# Patient Record
Sex: MALE | Race: WHITE | Employment: FULL TIME | URBAN - METROPOLITAN AREA
[De-identification: names, ages, dates, MRNs, and addresses within clinical notes are randomized per-mention and may not be internally consistent; named-entity substitution may affect disease eponyms.]

---

## 2018-05-01 ENCOUNTER — NURSE ONLY (OUTPATIENT)
Dept: INTERNAL MEDICINE CLINIC | Facility: HOSPITAL | Age: 33
End: 2018-05-01
Attending: EMERGENCY MEDICINE

## 2018-05-01 DIAGNOSIS — Z00.00 WELLNESS EXAMINATION: Primary | ICD-10-CM

## 2018-05-01 PROCEDURE — 86480 TB TEST CELL IMMUN MEASURE: CPT

## 2018-09-11 ENCOUNTER — HOSPITAL ENCOUNTER (OUTPATIENT)
Age: 33
Discharge: HOME | End: 2018-09-11
Attending: EMERGENCY MEDICINE
Payer: COMMERCIAL

## 2018-09-11 VITALS
DIASTOLIC BLOOD PRESSURE: 94 MMHG | SYSTOLIC BLOOD PRESSURE: 134 MMHG | RESPIRATION RATE: 18 BRPM | HEART RATE: 63 BPM | TEMPERATURE: 99 F | OXYGEN SATURATION: 99 %

## 2018-09-11 DIAGNOSIS — H57.89 EYE IRRITATION: Primary | ICD-10-CM

## 2018-09-11 DIAGNOSIS — H21.01 HYPHEMA, RIGHT: ICD-10-CM

## 2018-09-11 PROCEDURE — 99202 OFFICE O/P NEW SF 15 MIN: CPT

## 2018-09-11 NOTE — ED PROVIDER NOTES
Patient Seen in: [facility]    History   Patient presents with:  Irritation    Stated Complaint: eye irritation 3 days    HPI    Right eye irritation for 3 days and took off contacts and did not improve.   States that 3 weeks ago Left Ear: External ear normal.   Mouth/Throat: Oropharynx is clear and moist.   Eyes: EOM are normal. Pupils are equal, round, and reactive to light. Right conjunctiva is injected. Scleral icterus is present. Right pupil is round and reactive.  Left pupil i

## 2018-09-11 NOTE — ED INITIAL ASSESSMENT (HPI)
CC: right eye irritation/redness for 3 days, with yellowish discharge and possible foreign body- states \"feels like some fragments of contact lens. \"

## 2019-05-07 ENCOUNTER — APPOINTMENT (OUTPATIENT)
Dept: OTHER | Facility: HOSPITAL | Age: 34
End: 2019-05-07
Attending: ORTHOPAEDIC SURGERY

## 2019-08-14 ENCOUNTER — OFFICE VISIT (OUTPATIENT)
Dept: RHEUMATOLOGY | Facility: CLINIC | Age: 34
End: 2019-08-14
Payer: COMMERCIAL

## 2019-08-14 VITALS
BODY MASS INDEX: 36.43 KG/M2 | HEIGHT: 69 IN | DIASTOLIC BLOOD PRESSURE: 81 MMHG | SYSTOLIC BLOOD PRESSURE: 137 MMHG | WEIGHT: 246 LBS | HEART RATE: 65 BPM

## 2019-08-14 DIAGNOSIS — L40.9 PSORIASIS: Primary | ICD-10-CM

## 2019-08-14 DIAGNOSIS — R25.2 HAND CRAMPS: ICD-10-CM

## 2019-08-14 PROCEDURE — 99244 OFF/OP CNSLTJ NEW/EST MOD 40: CPT | Performed by: INTERNAL MEDICINE

## 2019-08-14 RX ORDER — KETOCONAZOLE 20 MG/G
CREAM TOPICAL 2 TIMES DAILY PRN
Refills: 2 | COMMUNITY
Start: 2019-08-05 | End: 2020-08-27

## 2019-08-14 RX ORDER — TACROLIMUS 1 MG/G
OINTMENT TOPICAL 2 TIMES DAILY
Refills: 1 | COMMUNITY
Start: 2019-08-05 | End: 2020-08-27

## 2019-08-14 RX ORDER — DESONIDE 0.5 MG/G
CREAM TOPICAL
Refills: 1 | COMMUNITY
Start: 2019-08-05 | End: 2020-08-27

## 2019-08-14 RX ORDER — CLOBETASOL PROPIONATE 0.5 MG/G
CREAM TOPICAL 2 TIMES DAILY
Refills: 0 | COMMUNITY
Start: 2019-08-05 | End: 2020-08-27

## 2019-08-14 NOTE — PROGRESS NOTES
Dear Dr. Kaufman Re: I saw your patient Jonathan Martinez in consultation this afternoon at your request for evaluation of hand cramps and arthralgias. As you know, he is a 27-year-old gentleman who 10 years ago developed severe psoriasis.   He was started on ix weight loss, skin sun sensitivity. Usual male pattern hair loss. No eye inflammation, oral and nasal ulcers, lymphadenopathy. No shortness of breath or chest pain. Mild acid reflux.   No stomach pain, nausea or vomiting, constipation or diarrhea, blood

## 2019-09-10 ENCOUNTER — HOSPITAL ENCOUNTER (OUTPATIENT)
Dept: GENERAL RADIOLOGY | Facility: HOSPITAL | Age: 34
Discharge: HOME | End: 2019-09-10
Attending: DERMATOLOGY
Payer: COMMERCIAL

## 2019-09-10 DIAGNOSIS — Z86.11 HISTORY OF TUBERCULOSIS: ICD-10-CM

## 2019-09-10 PROCEDURE — 71046 X-RAY EXAM CHEST 2 VIEWS: CPT | Performed by: DERMATOLOGY

## 2020-07-01 ENCOUNTER — APPOINTMENT (OUTPATIENT)
Dept: OTHER | Facility: HOSPITAL | Age: 35
End: 2020-07-01
Attending: EMERGENCY MEDICINE

## 2020-08-20 ENCOUNTER — NURSE TRIAGE (OUTPATIENT)
Dept: INTERNAL MEDICINE CLINIC | Facility: CLINIC | Age: 35
End: 2020-08-20

## 2020-08-20 ENCOUNTER — LAB ENCOUNTER (OUTPATIENT)
Dept: LAB | Facility: HOSPITAL | Age: 35
End: 2020-08-20
Attending: PREVENTIVE MEDICINE
Payer: COMMERCIAL

## 2020-08-20 ENCOUNTER — TELEPHONE (OUTPATIENT)
Dept: INTERNAL MEDICINE CLINIC | Facility: HOSPITAL | Age: 35
End: 2020-08-20

## 2020-08-20 DIAGNOSIS — Z20.822 SUSPECTED COVID-19 VIRUS INFECTION: Primary | ICD-10-CM

## 2020-08-20 DIAGNOSIS — Z20.822 SUSPECTED COVID-19 VIRUS INFECTION: ICD-10-CM

## 2020-08-20 LAB — SARS-COV-2 RNA RESP QL NAA+PROBE: DETECTED

## 2020-08-20 NOTE — TELEPHONE ENCOUNTER
Action Requested: Summary for Provider     []  Critical Lab, Recommendations Needed  [] Need Additional Advice  []   FYI    []   Need Orders  [] Need Medications Sent to Pharmacy  []  Other     SUMMARY: has tested positive for covid-19 today.   Reports head

## 2020-08-21 ENCOUNTER — TELEMEDICINE (OUTPATIENT)
Dept: INTERNAL MEDICINE CLINIC | Facility: CLINIC | Age: 35
End: 2020-08-21

## 2020-08-21 DIAGNOSIS — R05.9 COUGH: ICD-10-CM

## 2020-08-21 DIAGNOSIS — U07.1 COVID-19 VIRUS DETECTED: Primary | ICD-10-CM

## 2020-08-21 PROCEDURE — 99213 OFFICE O/P EST LOW 20 MIN: CPT | Performed by: PHYSICIAN ASSISTANT

## 2020-08-21 NOTE — PROGRESS NOTES
This is a telemedicine visit with live, interactive video and audio. Patient understands and accepts financial responsibility for any deductible, co-insurance and/or co-pays associated with this service.     SUBJECTIVE  Patient presents for follow up of

## 2020-08-27 ENCOUNTER — TELEPHONE (OUTPATIENT)
Dept: INTERNAL MEDICINE CLINIC | Facility: CLINIC | Age: 35
End: 2020-08-27

## 2020-08-27 ENCOUNTER — VIRTUAL PHONE E/M (OUTPATIENT)
Dept: INTERNAL MEDICINE CLINIC | Facility: CLINIC | Age: 35
End: 2020-08-27
Payer: COMMERCIAL

## 2020-08-27 ENCOUNTER — PATIENT OUTREACH (OUTPATIENT)
Dept: CASE MANAGEMENT | Age: 35
End: 2020-08-27

## 2020-08-27 DIAGNOSIS — R51.9 GENERALIZED HEADACHE: ICD-10-CM

## 2020-08-27 DIAGNOSIS — U07.1 COVID-19 VIRUS DETECTED: Primary | ICD-10-CM

## 2020-08-27 PROCEDURE — 99213 OFFICE O/P EST LOW 20 MIN: CPT | Performed by: PHYSICIAN ASSISTANT

## 2020-08-27 RX ORDER — IBUPROFEN 600 MG/1
600 TABLET ORAL EVERY 6 HOURS PRN
Qty: 30 TABLET | Refills: 0 | Status: SHIPPED | OUTPATIENT
Start: 2020-08-27 | End: 2021-09-29 | Stop reason: ALTCHOICE

## 2020-08-27 NOTE — TELEPHONE ENCOUNTER
Called [facility] and pharmacist verifies Rx was received; states she is the only one at the pharmacy this morning and did not speak with pt.  States [facility] [place] on [address] and seems pt used to go to the other one; wonders if pt meant it to be sent th

## 2020-08-27 NOTE — PROGRESS NOTES
Virtual Telephone Check-In    Radha Rose verbally consents to a Virtual/Telephone Check-In visit on 08/27/20. Patient has been referred to the [facility] website at [email or web address] to review the yearly Consent to Treat document.     Patient unde

## 2020-08-27 NOTE — TELEPHONE ENCOUNTER
Patient called and advised he called [facility] about 1 hour ago and they have no record of this prescription being sent to them. Please Advise.       Please Use Pharmacy:  [facility] [address] [facility] [address], [place]     Medication Needed:  Nirav Brown

## 2020-08-27 NOTE — PROGRESS NOTES
Sylvester Quigley PA-C  Em Rn Triage 2 hours ago (9:50 AM)      Can we please add this pt to covid home monitoring?

## 2020-08-28 ENCOUNTER — PATIENT OUTREACH (OUTPATIENT)
Dept: CASE MANAGEMENT | Age: 35
End: 2020-08-28

## 2020-08-28 NOTE — PROGRESS NOTES
Home Monitoring Condition Update    Covid19+ test date: 8/20/20      Consent Verification:  Assessment Completed With: Patient  HIPAA Verified?   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       The patient was also directed to continue to isolate away from other household members when possible and stay completely isolated from the gener

## 2020-08-31 ENCOUNTER — TELEPHONE (OUTPATIENT)
Dept: INTERNAL MEDICINE CLINIC | Facility: CLINIC | Age: 35
End: 2020-08-31

## 2020-08-31 ENCOUNTER — VIRTUAL PHONE E/M (OUTPATIENT)
Dept: INTERNAL MEDICINE CLINIC | Facility: CLINIC | Age: 35
End: 2020-08-31
Payer: COMMERCIAL

## 2020-08-31 DIAGNOSIS — U07.1 COVID-19 VIRUS DETECTED: Primary | ICD-10-CM

## 2020-08-31 PROCEDURE — 99213 OFFICE O/P EST LOW 20 MIN: CPT | Performed by: PHYSICIAN ASSISTANT

## 2020-08-31 NOTE — TELEPHONE ENCOUNTER
Patient had virtual visit today and was cleared to return to work and letter was provided. Please advise if patient can be removed from home monitoring program.  Patient completed 1 assessment. Thank you!      REPLY TO [facility] HOME MONITORING POOL

## 2020-09-22 ENCOUNTER — HOSPITAL ENCOUNTER (OUTPATIENT)
Age: 35
Discharge: HOME | End: 2020-09-22
Payer: COMMERCIAL

## 2020-09-22 ENCOUNTER — TELEPHONE (OUTPATIENT)
Dept: OPHTHALMOLOGY | Facility: CLINIC | Age: 35
End: 2020-09-22

## 2020-09-22 VITALS
BODY MASS INDEX: 34.07 KG/M2 | TEMPERATURE: 97 F | SYSTOLIC BLOOD PRESSURE: 143 MMHG | HEIGHT: 69 IN | DIASTOLIC BLOOD PRESSURE: 93 MMHG | WEIGHT: 230 LBS | HEART RATE: 85 BPM | RESPIRATION RATE: 20 BRPM | OXYGEN SATURATION: 98 %

## 2020-09-22 DIAGNOSIS — H05.229 ORBITAL SWELLING: ICD-10-CM

## 2020-09-22 DIAGNOSIS — H00.024 HORDEOLUM INTERNUM OF LEFT UPPER EYELID: Primary | ICD-10-CM

## 2020-09-22 PROCEDURE — 87205 SMEAR GRAM STAIN: CPT | Performed by: NURSE PRACTITIONER

## 2020-09-22 PROCEDURE — 87070 CULTURE OTHR SPECIMN AEROBIC: CPT | Performed by: NURSE PRACTITIONER

## 2020-09-22 PROCEDURE — 99204 OFFICE O/P NEW MOD 45 MIN: CPT

## 2020-09-22 PROCEDURE — 99214 OFFICE O/P EST MOD 30 MIN: CPT

## 2020-09-22 PROCEDURE — 87077 CULTURE AEROBIC IDENTIFY: CPT | Performed by: NURSE PRACTITIONER

## 2020-09-22 RX ORDER — TETRACAINE HYDROCHLORIDE 5 MG/ML
2 SOLUTION OPHTHALMIC ONCE
Status: COMPLETED | OUTPATIENT
Start: 2020-09-22 | End: 2020-09-22

## 2020-09-22 RX ORDER — CEPHALEXIN 500 MG/1
500 CAPSULE ORAL 4 TIMES DAILY
Qty: 28 CAPSULE | Refills: 0 | Status: SHIPPED | OUTPATIENT
Start: 2020-09-22 | End: 2020-09-29

## 2020-09-22 NOTE — ED INITIAL ASSESSMENT (HPI)
PATIENT ARRIVED AMBULATORY TO ROOM. PATIENT STATES \"I THINK I GOT SOMETHING IN MY LEFT EYE A FEW DAYS AGO. IT MAY HAVE BEEN CONCRETE DUST. I THINK I GOT IT OUT BUT MY EYELID IS ALL SWOLLEN\" +REDNESS AND SWELLING TO THE LEFT UPPER EYELID.  NO CONTACT LENS

## 2020-09-22 NOTE — ED PROVIDER NOTES
[facility]      History   Patient presents with:  Eye Problem    Stated Complaint: left eye swollen    HPI  This is a 78-year-old male presenting with left upper eyelid swelling.   Patient states on Friday Left Ear: Tympanic membrane normal.      Nose: Nose normal.      Mouth/Throat:      Mouth: Mucous membranes are moist.      Pharynx: Oropharynx is clear. Eyes:      Extraocular Movements: Extraocular movements intact.       Conjunctiva/sclera: Conjunct 49-year-old male well-appearing and nontoxic presenting with left upper lid swelling.   Procedure for tetracaine and fluorescein staining performed no corneal uptake did note uptake to the left upper lid where it appears patient has a very superficial lacer

## 2020-09-22 NOTE — TELEPHONE ENCOUNTER
Spoke with patient. He is taking oral medication and feels like symptoms are improving. He does not think he needs a follow up at this time.   I told him to finish the course of antibiotics as directed and per Dr. Katheryn Kemp advice to use warm compresses

## 2020-10-29 ENCOUNTER — HOSPITAL ENCOUNTER (OUTPATIENT)
Age: 35
Discharge: HOME | End: 2020-10-29
Attending: EMERGENCY MEDICINE
Payer: COMMERCIAL

## 2020-10-29 VITALS
DIASTOLIC BLOOD PRESSURE: 96 MMHG | RESPIRATION RATE: 18 BRPM | OXYGEN SATURATION: 98 % | SYSTOLIC BLOOD PRESSURE: 141 MMHG | TEMPERATURE: 97 F | HEART RATE: 74 BPM

## 2020-10-29 DIAGNOSIS — K92.2 GASTROINTESTINAL HEMORRHAGE, UNSPECIFIED GASTROINTESTINAL HEMORRHAGE TYPE: Primary | ICD-10-CM

## 2020-10-29 PROCEDURE — 99213 OFFICE O/P EST LOW 20 MIN: CPT

## 2020-10-29 PROCEDURE — 85025 COMPLETE CBC W/AUTO DIFF WBC: CPT | Performed by: EMERGENCY MEDICINE

## 2020-10-29 PROCEDURE — 36415 COLL VENOUS BLD VENIPUNCTURE: CPT

## 2020-10-29 RX ORDER — PANTOPRAZOLE SODIUM 40 MG/1
40 TABLET, DELAYED RELEASE ORAL DAILY
Qty: 14 TABLET | Refills: 0 | Status: SHIPPED | OUTPATIENT
Start: 2020-10-29 | End: 2020-11-12

## 2020-10-29 NOTE — ED INITIAL ASSESSMENT (HPI)
Pt here to IC with c/o upper abd pain, intermittent for the past few days. Pt states he had black diarrhea stool yesterday then turned to bright red blood.

## 2020-10-29 NOTE — ED PROVIDER NOTES
Patient Seen in: [facility]      History   Patient presents with:  Abdomen/Flank Pain    Stated Complaint: Stomach issues; blood in stool    HPI    39 yo male with epigastric pain and blood in his stool for a few days. No vomiting. No fever. Status: He is alert. Sensory: No sensory deficit.    Psychiatric:         Mood and Affect: Mood normal.         Behavior: Behavior normal.              ED Course     Labs Reviewed   POCT CBC - Abnormal; Notable for the following components:       Resul

## 2020-11-02 NOTE — H&P
[address] [facility] [address] Gastroenterology                                                                                                  Clinic History and Physical     Pa quittin.2      Smokeless tobacco: Never Used    Alcohol use: Yes      Frequency: 2-3 times a week    Drug use: Never       Medications (Active prior to today's visit):  Current Outpatient Medications   Medication Sig Dispense Refill   • ibuprofen 600 M vitals reviewed    Labs/Imaging:     Patient's labs and imaging were reviewed and discussed with patient today. See HPI and A&P for further details.      .  ASSESSMENT/PLAN:   Mague Swartz is a 38year old year-old male pt of Dr. Yaritza finley Imaging & Referrals:  None       CHANO Morfin  11/16/2020

## 2020-11-16 ENCOUNTER — OFFICE VISIT (OUTPATIENT)
Dept: GASTROENTEROLOGY | Facility: CLINIC | Age: 35
End: 2020-11-16
Payer: COMMERCIAL

## 2020-11-16 VITALS
BODY MASS INDEX: 34.8 KG/M2 | WEIGHT: 235 LBS | DIASTOLIC BLOOD PRESSURE: 86 MMHG | HEIGHT: 69 IN | SYSTOLIC BLOOD PRESSURE: 132 MMHG | HEART RATE: 64 BPM

## 2020-11-16 DIAGNOSIS — R10.10 UPPER ABDOMINAL PAIN: ICD-10-CM

## 2020-11-16 DIAGNOSIS — K62.5 BRBPR (BRIGHT RED BLOOD PER RECTUM): Primary | ICD-10-CM

## 2020-11-16 PROCEDURE — 99072 ADDL SUPL MATRL&STAF TM PHE: CPT | Performed by: NURSE PRACTITIONER

## 2020-11-16 PROCEDURE — 3075F SYST BP GE 130 - 139MM HG: CPT | Performed by: NURSE PRACTITIONER

## 2020-11-16 PROCEDURE — 3008F BODY MASS INDEX DOCD: CPT | Performed by: NURSE PRACTITIONER

## 2020-11-16 PROCEDURE — 3079F DIAST BP 80-89 MM HG: CPT | Performed by: NURSE PRACTITIONER

## 2020-11-16 PROCEDURE — 99203 OFFICE O/P NEW LOW 30 MIN: CPT | Performed by: NURSE PRACTITIONER

## 2020-12-18 NOTE — PROGRESS NOTES
Patient needs to schedule appointment with me.Has been almost 1 year since seen.    Call and schedule patient for sometime in the next 1-2 months.   Virtual Telephone Check-In    Jim Mariscal verbally consents to a Virtual/Telephone Check-In visit on 08/31/20. Patient has been referred to the [facility] website at [email or web address] to review the yearly Consent to Treat document.     Patient rasheed

## 2020-12-30 ENCOUNTER — TELEPHONE (OUTPATIENT)
Dept: GASTROENTEROLOGY | Facility: CLINIC | Age: 35
End: 2020-12-30

## 2021-01-21 ENCOUNTER — IMMUNIZATION (OUTPATIENT)
Dept: LAB | Facility: HOSPITAL | Age: 36
End: 2021-01-21
Attending: EMERGENCY MEDICINE
Payer: COMMERCIAL

## 2021-01-21 DIAGNOSIS — Z23 NEED FOR VACCINATION: Primary | ICD-10-CM

## 2021-01-21 PROCEDURE — 0011A SARSCOV2 VAC 100MCG/0.5ML IM: CPT

## 2021-01-26 ENCOUNTER — TELEPHONE (OUTPATIENT)
Dept: INTERNAL MEDICINE CLINIC | Facility: CLINIC | Age: 36
End: 2021-01-26

## 2021-02-18 ENCOUNTER — IMMUNIZATION (OUTPATIENT)
Dept: LAB | Facility: HOSPITAL | Age: 36
End: 2021-02-18
Attending: PREVENTIVE MEDICINE
Payer: COMMERCIAL

## 2021-02-18 DIAGNOSIS — Z23 NEED FOR VACCINATION: Primary | ICD-10-CM

## 2021-02-18 PROCEDURE — 0012A SARSCOV2 VAC 100MCG/0.5ML IM: CPT

## 2021-07-28 ENCOUNTER — APPOINTMENT (OUTPATIENT)
Dept: OTHER | Facility: HOSPITAL | Age: 36
End: 2021-07-28
Attending: EMERGENCY MEDICINE

## 2021-09-28 NOTE — PROGRESS NOTES
FAMILY MEDICINE CLINIC NOTE    HPI  Carol Luong is a 38year old male presenting for physical    #Health Maintenance  -Diet: Eats fruits, vegetables, meat, dairy. Fast food.    -Exercise: None - just walks around at work  -Lung cancer screen: Not ind lung, latent        PAST SOCIAL HISTORY  Social History    Socioeconomic History      Marital status: Single      Spouse name: Not on file      Number of children: Not on file      Years of education: Not on file      Highest education level: Not on file Active Member of Clubs or Organizations: Not on file      Attends Club or Organization Meetings: Not on file      Marital Status: Not on file  Intimate Partner Violence:       Fear of Current or Ex-Partner: Not on file      Emotionally Abused: Not on file LEONIDES Salomon [place], [place]     DIAGNOSTICS    ASSESSMENT/PLAN  Problem List Items Addressed This Visit        Respiratory    TB lung, latent     History of latent TB, reports that he was treated for 4 months.   We will check chest x-ray this

## 2021-09-29 ENCOUNTER — OFFICE VISIT (OUTPATIENT)
Dept: INTERNAL MEDICINE CLINIC | Facility: CLINIC | Age: 36
End: 2021-09-29
Payer: COMMERCIAL

## 2021-09-29 VITALS
BODY MASS INDEX: 36.41 KG/M2 | SYSTOLIC BLOOD PRESSURE: 110 MMHG | OXYGEN SATURATION: 99 % | DIASTOLIC BLOOD PRESSURE: 76 MMHG | WEIGHT: 245.81 LBS | HEIGHT: 69 IN | HEART RATE: 65 BPM

## 2021-09-29 DIAGNOSIS — E66.9 OBESITY (BMI 30-39.9): ICD-10-CM

## 2021-09-29 DIAGNOSIS — L40.9 PSORIASIS: ICD-10-CM

## 2021-09-29 DIAGNOSIS — Z00.00 HEALTH MAINTENANCE EXAMINATION: Primary | ICD-10-CM

## 2021-09-29 DIAGNOSIS — Z22.7 TB LUNG, LATENT: ICD-10-CM

## 2021-09-29 PROBLEM — J30.9 ALLERGIC RHINITIS: Status: ACTIVE | Noted: 2021-09-29

## 2021-09-29 PROBLEM — H10.13 ALLERGIC CONJUNCTIVITIS OF BOTH EYES: Status: ACTIVE | Noted: 2021-09-29

## 2021-09-29 PROBLEM — M19.90 ARTHRITIS: Status: ACTIVE | Noted: 2021-09-29

## 2021-09-29 PROBLEM — J34.89 POSTERIOR RHINORRHEA: Status: RESOLVED | Noted: 2021-09-29 | Resolved: 2021-09-29

## 2021-09-29 PROBLEM — J34.89 POSTERIOR RHINORRHEA: Status: ACTIVE | Noted: 2021-09-29

## 2021-09-29 PROBLEM — H10.13 ALLERGIC CONJUNCTIVITIS OF BOTH EYES: Status: RESOLVED | Noted: 2021-09-29 | Resolved: 2021-09-29

## 2021-09-29 PROBLEM — L20.9 ATOPIC DERMATITIS: Status: RESOLVED | Noted: 2021-09-29 | Resolved: 2021-09-29

## 2021-09-29 PROBLEM — L20.9 ATOPIC DERMATITIS: Status: ACTIVE | Noted: 2021-09-29

## 2021-09-29 LAB
ALBUMIN SERPL-MCNC: 4 G/DL (ref 3.4–5)
ALBUMIN/GLOB SERPL: 1 {RATIO} (ref 1–2)
ALP LIVER SERPL-CCNC: 81 U/L
ALT SERPL-CCNC: 49 U/L
ANION GAP SERPL CALC-SCNC: 6 MMOL/L (ref 0–18)
AST SERPL-CCNC: 22 U/L (ref 15–37)
BASOPHILS # BLD AUTO: 0.06 X10(3) UL (ref 0–0.2)
BASOPHILS NFR BLD AUTO: 0.9 %
BILIRUB SERPL-MCNC: 0.5 MG/DL (ref 0.1–2)
BUN BLD-MCNC: 12 MG/DL (ref 7–18)
BUN/CREAT SERPL: 11.4 (ref 10–20)
CALCIUM BLD-MCNC: 9.3 MG/DL (ref 8.5–10.1)
CHLORIDE SERPL-SCNC: 108 MMOL/L (ref 98–112)
CHOLEST SERPL-MCNC: 215 MG/DL (ref ?–200)
CO2 SERPL-SCNC: 24 MMOL/L (ref 21–32)
CREAT BLD-MCNC: 1.05 MG/DL
DEPRECATED RDW RBC AUTO: 41.7 FL (ref 35.1–46.3)
EOSINOPHIL # BLD AUTO: 0.11 X10(3) UL (ref 0–0.7)
EOSINOPHIL NFR BLD AUTO: 1.7 %
ERYTHROCYTE [DISTWIDTH] IN BLOOD BY AUTOMATED COUNT: 11.9 % (ref 11–15)
EST. AVERAGE GLUCOSE BLD GHB EST-MCNC: 103 MG/DL (ref 68–126)
GLOBULIN PLAS-MCNC: 4 G/DL (ref 2.8–4.4)
GLUCOSE BLD-MCNC: 95 MG/DL (ref 70–99)
HBA1C MFR BLD HPLC: 5.2 % (ref ?–5.7)
HCT VFR BLD AUTO: 50.1 %
HCV AB SERPL QL IA: NONREACTIVE
HDLC SERPL-MCNC: 42 MG/DL (ref 40–59)
HGB BLD-MCNC: 16.6 G/DL
IMM GRANULOCYTES # BLD AUTO: 0.02 X10(3) UL (ref 0–1)
IMM GRANULOCYTES NFR BLD: 0.3 %
LDLC SERPL CALC-MCNC: 149 MG/DL (ref ?–100)
LYMPHOCYTES # BLD AUTO: 2.52 X10(3) UL (ref 1–4)
LYMPHOCYTES NFR BLD AUTO: 37.8 %
MCH RBC QN AUTO: 31.6 PG (ref 26–34)
MCHC RBC AUTO-ENTMCNC: 33.1 G/DL (ref 31–37)
MCV RBC AUTO: 95.4 FL
MONOCYTES # BLD AUTO: 0.65 X10(3) UL (ref 0.1–1)
MONOCYTES NFR BLD AUTO: 9.8 %
NEUTROPHILS # BLD AUTO: 3.3 X10 (3) UL (ref 1.5–7.7)
NEUTROPHILS # BLD AUTO: 3.3 X10(3) UL (ref 1.5–7.7)
NEUTROPHILS NFR BLD AUTO: 49.5 %
NONHDLC SERPL-MCNC: 173 MG/DL (ref ?–130)
OSMOLALITY SERPL CALC.SUM OF ELEC: 286 MOSM/KG (ref 275–295)
PATIENT FASTING Y/N/NP: YES
PATIENT FASTING Y/N/NP: YES
PLATELET # BLD AUTO: 207 10(3)UL (ref 150–450)
POTASSIUM SERPL-SCNC: 4.1 MMOL/L (ref 3.5–5.1)
PROT SERPL-MCNC: 8 G/DL (ref 6.4–8.2)
RBC # BLD AUTO: 5.25 X10(6)UL
SODIUM SERPL-SCNC: 138 MMOL/L (ref 136–145)
TRIGL SERPL-MCNC: 132 MG/DL (ref 30–149)
TSI SER-ACNC: 0.84 MIU/ML (ref 0.36–3.74)
VLDLC SERPL CALC-MCNC: 25 MG/DL (ref 0–30)
WBC # BLD AUTO: 6.7 X10(3) UL (ref 4–11)

## 2021-09-29 PROCEDURE — 90715 TDAP VACCINE 7 YRS/> IM: CPT | Performed by: FAMILY MEDICINE

## 2021-09-29 PROCEDURE — 84443 ASSAY THYROID STIM HORMONE: CPT | Performed by: FAMILY MEDICINE

## 2021-09-29 PROCEDURE — 85025 COMPLETE CBC W/AUTO DIFF WBC: CPT | Performed by: FAMILY MEDICINE

## 2021-09-29 PROCEDURE — 3008F BODY MASS INDEX DOCD: CPT | Performed by: FAMILY MEDICINE

## 2021-09-29 PROCEDURE — 80053 COMPREHEN METABOLIC PANEL: CPT | Performed by: FAMILY MEDICINE

## 2021-09-29 PROCEDURE — 3074F SYST BP LT 130 MM HG: CPT | Performed by: FAMILY MEDICINE

## 2021-09-29 PROCEDURE — 99395 PREV VISIT EST AGE 18-39: CPT | Performed by: FAMILY MEDICINE

## 2021-09-29 PROCEDURE — 96127 BRIEF EMOTIONAL/BEHAV ASSMT: CPT | Performed by: FAMILY MEDICINE

## 2021-09-29 PROCEDURE — 87389 HIV-1 AG W/HIV-1&-2 AB AG IA: CPT | Performed by: FAMILY MEDICINE

## 2021-09-29 PROCEDURE — 90472 IMMUNIZATION ADMIN EACH ADD: CPT | Performed by: FAMILY MEDICINE

## 2021-09-29 PROCEDURE — 90686 IIV4 VACC NO PRSV 0.5 ML IM: CPT | Performed by: FAMILY MEDICINE

## 2021-09-29 PROCEDURE — 3078F DIAST BP <80 MM HG: CPT | Performed by: FAMILY MEDICINE

## 2021-09-29 PROCEDURE — 83036 HEMOGLOBIN GLYCOSYLATED A1C: CPT | Performed by: FAMILY MEDICINE

## 2021-09-29 PROCEDURE — 90471 IMMUNIZATION ADMIN: CPT | Performed by: FAMILY MEDICINE

## 2021-09-29 PROCEDURE — 80061 LIPID PANEL: CPT | Performed by: FAMILY MEDICINE

## 2021-09-29 PROCEDURE — 86803 HEPATITIS C AB TEST: CPT | Performed by: FAMILY MEDICINE

## 2021-09-29 RX ORDER — RISANKIZUMAB-RZAA 150 MG/ML
INJECTION SUBCUTANEOUS
COMMUNITY
Start: 2021-09-13

## 2021-09-29 RX ORDER — RISANKIZUMAB-RZAA 75 MG/0.83
KIT SUBCUTANEOUS
COMMUNITY
Start: 2021-06-25

## 2021-09-29 NOTE — ASSESSMENT & PLAN NOTE
Exercise and diet advised. CBC, CMP, lipid panel, Hba1c, TSH, HIV screen, hepatitis C screen  Tdap today. Flu shot today. Advanced directive information provided.

## 2021-09-29 NOTE — PATIENT INSTRUCTIONS
• Thank you for seeing me today, it was a pleasure taking care of you. • Please check out at the  and schedule a follow up appointment. Return in about 6 months (around 3/29/2022) for follow up.   • Please make a lab appointment to get your bloo

## 2021-09-29 NOTE — ASSESSMENT & PLAN NOTE
History of latent TB, reports that he was treated for 4 months.   We will check chest x-ray this time to make sure that it remains clear, especially since he is on [employer]

## 2021-09-30 ENCOUNTER — HOSPITAL ENCOUNTER (OUTPATIENT)
Dept: GENERAL RADIOLOGY | Facility: HOSPITAL | Age: 36
Discharge: HOME | End: 2021-09-30
Attending: FAMILY MEDICINE
Payer: COMMERCIAL

## 2021-09-30 DIAGNOSIS — Z22.7 TB LUNG, LATENT: ICD-10-CM

## 2021-09-30 PROBLEM — E78.5 DYSLIPIDEMIA: Status: ACTIVE | Noted: 2021-09-30

## 2021-09-30 PROCEDURE — 71046 X-RAY EXAM CHEST 2 VIEWS: CPT | Performed by: FAMILY MEDICINE

## 2022-07-27 ENCOUNTER — APPOINTMENT (OUTPATIENT)
Dept: OTHER | Facility: HOSPITAL | Age: 37
End: 2022-07-27
Attending: EMERGENCY MEDICINE

## 2022-09-29 ENCOUNTER — OFFICE VISIT (OUTPATIENT)
Dept: INTERNAL MEDICINE CLINIC | Facility: CLINIC | Age: 37
End: 2022-09-29

## 2022-09-29 VITALS
SYSTOLIC BLOOD PRESSURE: 118 MMHG | TEMPERATURE: 98 F | OXYGEN SATURATION: 99 % | HEART RATE: 60 BPM | BODY MASS INDEX: 36.73 KG/M2 | DIASTOLIC BLOOD PRESSURE: 88 MMHG | HEIGHT: 69 IN | WEIGHT: 248 LBS

## 2022-09-29 DIAGNOSIS — E78.5 DYSLIPIDEMIA: ICD-10-CM

## 2022-09-29 DIAGNOSIS — H91.92 DECREASED HEARING OF LEFT EAR: ICD-10-CM

## 2022-09-29 DIAGNOSIS — R06.83 SNORING: ICD-10-CM

## 2022-09-29 DIAGNOSIS — E66.9 OBESITY (BMI 30-39.9): ICD-10-CM

## 2022-09-29 DIAGNOSIS — Z00.00 HEALTH MAINTENANCE EXAMINATION: Primary | ICD-10-CM

## 2022-09-29 DIAGNOSIS — J30.9 ALLERGIC RHINITIS, UNSPECIFIED SEASONALITY, UNSPECIFIED TRIGGER: ICD-10-CM

## 2022-09-29 DIAGNOSIS — L40.9 PSORIASIS: ICD-10-CM

## 2022-09-29 PROBLEM — M19.90 ARTHRITIS: Status: RESOLVED | Noted: 2021-09-29 | Resolved: 2022-09-29

## 2022-09-29 LAB
ALBUMIN SERPL-MCNC: 4 G/DL (ref 3.4–5)
ALBUMIN/GLOB SERPL: 1 {RATIO} (ref 1–2)
ALP LIVER SERPL-CCNC: 76 U/L
ALT SERPL-CCNC: 54 U/L
ANION GAP SERPL CALC-SCNC: 7 MMOL/L (ref 0–18)
AST SERPL-CCNC: 23 U/L (ref 15–37)
BASOPHILS # BLD AUTO: 0.06 X10(3) UL (ref 0–0.2)
BASOPHILS NFR BLD AUTO: 0.8 %
BILIRUB SERPL-MCNC: 0.6 MG/DL (ref 0.1–2)
BUN BLD-MCNC: 9 MG/DL (ref 7–18)
BUN/CREAT SERPL: 8.3 (ref 10–20)
CALCIUM BLD-MCNC: 9.3 MG/DL (ref 8.5–10.1)
CHLORIDE SERPL-SCNC: 106 MMOL/L (ref 98–112)
CHOLEST SERPL-MCNC: 191 MG/DL (ref ?–200)
CO2 SERPL-SCNC: 25 MMOL/L (ref 21–32)
CREAT BLD-MCNC: 1.09 MG/DL
DEPRECATED RDW RBC AUTO: 43 FL (ref 35.1–46.3)
EOSINOPHIL # BLD AUTO: 0.13 X10(3) UL (ref 0–0.7)
EOSINOPHIL NFR BLD AUTO: 1.8 %
ERYTHROCYTE [DISTWIDTH] IN BLOOD BY AUTOMATED COUNT: 11.8 % (ref 11–15)
EST. AVERAGE GLUCOSE BLD GHB EST-MCNC: 103 MG/DL (ref 68–126)
FASTING PATIENT LIPID ANSWER: YES
FASTING STATUS PATIENT QL REPORTED: YES
GFR SERPLBLD BASED ON 1.73 SQ M-ARVRAT: 89 ML/MIN/1.73M2 (ref 60–?)
GLOBULIN PLAS-MCNC: 3.9 G/DL (ref 2.8–4.4)
GLUCOSE BLD-MCNC: 81 MG/DL (ref 70–99)
HBA1C MFR BLD: 5.2 % (ref ?–5.7)
HCT VFR BLD AUTO: 52 %
HDLC SERPL-MCNC: 40 MG/DL (ref 40–59)
HGB BLD-MCNC: 16.7 G/DL
IMM GRANULOCYTES # BLD AUTO: 0.02 X10(3) UL (ref 0–1)
IMM GRANULOCYTES NFR BLD: 0.3 %
LDLC SERPL CALC-MCNC: 131 MG/DL (ref ?–100)
LYMPHOCYTES # BLD AUTO: 2.82 X10(3) UL (ref 1–4)
LYMPHOCYTES NFR BLD AUTO: 38.1 %
MCH RBC QN AUTO: 31.7 PG (ref 26–34)
MCHC RBC AUTO-ENTMCNC: 32.1 G/DL (ref 31–37)
MCV RBC AUTO: 98.7 FL
MONOCYTES # BLD AUTO: 0.61 X10(3) UL (ref 0.1–1)
MONOCYTES NFR BLD AUTO: 8.2 %
NEUTROPHILS # BLD AUTO: 3.76 X10 (3) UL (ref 1.5–7.7)
NEUTROPHILS # BLD AUTO: 3.76 X10(3) UL (ref 1.5–7.7)
NEUTROPHILS NFR BLD AUTO: 50.8 %
NONHDLC SERPL-MCNC: 151 MG/DL (ref ?–130)
OSMOLALITY SERPL CALC.SUM OF ELEC: 284 MOSM/KG (ref 275–295)
PLATELET # BLD AUTO: 209 10(3)UL (ref 150–450)
POTASSIUM SERPL-SCNC: 4 MMOL/L (ref 3.5–5.1)
PROT SERPL-MCNC: 7.9 G/DL (ref 6.4–8.2)
RBC # BLD AUTO: 5.27 X10(6)UL
SODIUM SERPL-SCNC: 138 MMOL/L (ref 136–145)
TRIGL SERPL-MCNC: 109 MG/DL (ref 30–149)
VLDLC SERPL CALC-MCNC: 20 MG/DL (ref 0–30)
WBC # BLD AUTO: 7.4 X10(3) UL (ref 4–11)

## 2022-09-29 PROCEDURE — 83036 HEMOGLOBIN GLYCOSYLATED A1C: CPT | Performed by: FAMILY MEDICINE

## 2022-09-29 PROCEDURE — 3008F BODY MASS INDEX DOCD: CPT | Performed by: FAMILY MEDICINE

## 2022-09-29 PROCEDURE — 3079F DIAST BP 80-89 MM HG: CPT | Performed by: FAMILY MEDICINE

## 2022-09-29 PROCEDURE — 85025 COMPLETE CBC W/AUTO DIFF WBC: CPT | Performed by: FAMILY MEDICINE

## 2022-09-29 PROCEDURE — 80053 COMPREHEN METABOLIC PANEL: CPT | Performed by: FAMILY MEDICINE

## 2022-09-29 PROCEDURE — 80061 LIPID PANEL: CPT | Performed by: FAMILY MEDICINE

## 2022-09-29 PROCEDURE — 99395 PREV VISIT EST AGE 18-39: CPT | Performed by: FAMILY MEDICINE

## 2022-09-29 PROCEDURE — 3074F SYST BP LT 130 MM HG: CPT | Performed by: FAMILY MEDICINE

## 2022-09-29 NOTE — ASSESSMENT & PLAN NOTE
Patient with a history of snoring, daytime fatigue, and has been observed to stop breathing at night. Elevated STOP-BANG score  Will refer to pulmonology for sleep evaluation.

## 2022-09-29 NOTE — ASSESSMENT & PLAN NOTE
Patient reports subjective decreased hearing in the left ear only when using his phone. He is not with any decreased hearing to finger rub or when I am talking. He notes that he had a normal hearing test recently. Will refer to ENT to further evaluate.

## 2022-09-29 NOTE — ASSESSMENT & PLAN NOTE
Exercise and diet advised. CBC, CMP, lipid panel, Hba1c  Flu shot advised - he will get later at work  Advanced directive information provided. Advised COVID vaccine booster.

## 2022-11-15 ENCOUNTER — IMMUNIZATION (OUTPATIENT)
Dept: LAB | Facility: HOSPITAL | Age: 37
End: 2022-11-15
Attending: PREVENTIVE MEDICINE
Payer: COMMERCIAL

## 2022-11-15 DIAGNOSIS — Z23 NEED FOR VACCINATION: Primary | ICD-10-CM

## 2022-11-15 PROCEDURE — 90471 IMMUNIZATION ADMIN: CPT

## 2022-11-21 ENCOUNTER — OFFICE VISIT (OUTPATIENT)
Dept: PULMONOLOGY | Facility: CLINIC | Age: 37
End: 2022-11-21
Payer: COMMERCIAL

## 2022-11-21 VITALS
OXYGEN SATURATION: 98 % | HEART RATE: 71 BPM | HEIGHT: 69 IN | RESPIRATION RATE: 16 BRPM | WEIGHT: 249 LBS | BODY MASS INDEX: 36.88 KG/M2 | DIASTOLIC BLOOD PRESSURE: 84 MMHG | SYSTOLIC BLOOD PRESSURE: 130 MMHG

## 2022-11-21 DIAGNOSIS — G47.33 OSA (OBSTRUCTIVE SLEEP APNEA): Primary | ICD-10-CM

## 2022-11-21 PROCEDURE — 3075F SYST BP GE 130 - 139MM HG: CPT | Performed by: INTERNAL MEDICINE

## 2022-11-21 PROCEDURE — 3008F BODY MASS INDEX DOCD: CPT | Performed by: INTERNAL MEDICINE

## 2022-11-21 PROCEDURE — 99243 OFF/OP CNSLTJ NEW/EST LOW 30: CPT | Performed by: INTERNAL MEDICINE

## 2022-11-21 PROCEDURE — 3079F DIAST BP 80-89 MM HG: CPT | Performed by: INTERNAL MEDICINE

## 2022-11-21 NOTE — PROGRESS NOTES
Dear Arnie Looney:           As you know, Onesimo Francois is a 75-year-old  for the hospital who I am now evaluating for sleep disturbance. HISTORY OF PRESENT ILLNESS: The patient describes having difficulty initiating and maintaining sleep as well as excessive daytime somnolence and difficulty awakening with unrefreshing sleep, snoring, witnessed apneic events, without associated drowsy driving. There is no cataplexy, sleep paralysis nor hypnagogic hallucination. There is occasional urge to move the limbs at night. The patient gets 6 to 8 hours of sleep per night, going to bed between 10 and 11, falling asleep within 15 to 30 minutes, awakening once or twice and then getting out of bed at 5:30 in the morning. He does drink alcohol 6-8 beers several times per week. There is been no recent weight gain, morning headache no depressed mood and the Varina Sleepiness Scale score is 7 out of 24. PAST MEDICAL AND SURGICAL HISTORY:   1. Dyslipidemia psoriasis latent TB    SOCIAL HISTORY: Single, no kids, quit tobacco 3 years ago after 1 pack/day for 15 years, 6-8 beers on several occasions per week,  for the hospital    FAMILY HISTORY: Mother and father alive and well    ALLERGIES TO MEDICATIONS: None    MEDICATIONS: None    REVIEW OF SYSTEMS: Review of Systems:  Vision normal. Ear nose and throat normal. Bowel normal. Bladder function normal. No depression. No thyroid disease. No lymphatic system concerns. Rash. Muscles and joints unremarkable. No weight loss no weight gain. PHYSICAL EXAMINATION: Physical Examination:  Vital signs normal. HEENT examination is unremarkable with pupils equal round and reactive to light and accommodation. Neck without adenopathy, thyromegaly, JVD nor bruit. Lungs clear to auscultation and percussion. Cardiac regular rate and rhythm no murmur. Abdomen nontender, without hepatosplenomegaly and no mass appreciable.  Extremities and Musculoskeletal without clubbing cyanosis nor edema, and mobility acceptable. Neurologic grossly intact with symmetric tone and strength and reflex. Crowded oropharynx Mallampati score 4. Generous tonsils. LABORATORY: None    ASSESSMENT AND PLAN:  PROBLEM 1. Sleep disturbance-my strong suspicion is that the patient has clinically significant obstructive sleep apnea with snoring, witnessed apneic events, unrefreshing sleep, excessive daytime somnolence, crowded oropharynx with weight of 249 pounds. He would benefit from polysomnography with split-night CPAP titration. RECOMMENDATIONS:  1.  Polysomnography with split-night CPAP titration  2. Weight loss  3. Avoid alcohol  4. Avoid sedating drug  5. Never drive if sleepy  6. Sleep apnea literature provided  7. If patient is set up with CPAP, needs to return to see me at the 3 to 4-month interval with download of data from the respiratory device to assess for efficacy and compliance. I am delighted to assist in Neil's care.             With warmest regards,     Bret Rockwell MD  Medical Director, [address], [address] [facility]  Medical Director, [address] [place] [address]. [phone]

## 2022-12-14 ENCOUNTER — ORDER TRANSCRIPTION (OUTPATIENT)
Dept: SLEEP CENTER | Age: 37
End: 2022-12-14

## 2022-12-14 DIAGNOSIS — G47.33 OBSTRUCTIVE SLEEP APNEA (ADULT) (PEDIATRIC): Primary | ICD-10-CM

## 2023-01-23 ENCOUNTER — LAB ENCOUNTER (OUTPATIENT)
Dept: LAB | Facility: HOSPITAL | Age: 38
End: 2023-01-23
Attending: INTERNAL MEDICINE
Payer: COMMERCIAL

## 2023-01-23 DIAGNOSIS — G47.33 OBSTRUCTIVE SLEEP APNEA (ADULT) (PEDIATRIC): ICD-10-CM

## 2023-01-23 LAB — SARS-COV-2 RNA RESP QL NAA+PROBE: NOT DETECTED

## 2023-01-26 ENCOUNTER — OFFICE VISIT (OUTPATIENT)
Dept: SLEEP CENTER | Age: 38
End: 2023-01-26
Attending: INTERNAL MEDICINE
Payer: COMMERCIAL

## 2023-01-26 DIAGNOSIS — G47.33 OSA (OBSTRUCTIVE SLEEP APNEA): ICD-10-CM

## 2023-01-26 PROCEDURE — 95811 POLYSOM 6/>YRS CPAP 4/> PARM: CPT

## 2023-02-01 ENCOUNTER — TELEPHONE (OUTPATIENT)
Dept: PULMONOLOGY | Facility: CLINIC | Age: 38
End: 2023-02-01

## 2023-02-01 DIAGNOSIS — G47.33 OSA (OBSTRUCTIVE SLEEP APNEA): Primary | ICD-10-CM

## 2023-02-01 NOTE — TELEPHONE ENCOUNTER
----- Message from Ana Haro MD sent at 1/31/2023  4:47 PM CST -----  RN, please call the patient to explain that he has severe obstructive sleep apnea and that he stops breathing 74 times per hour. Please facilitate CPAP 11 CWP.   Chio Cantrell

## 2023-02-01 NOTE — TELEPHONE ENCOUNTER
Spoke with patient and he verbalizes understanding of result note. Sleep study, face sheet, DME order, and LOV note faxed to E. He is aware he will need appt 30-90 days after starting to use CPAP. He is already scheduled for 5/22/23 and understands he may need to move appt sooner.

## 2023-04-17 ENCOUNTER — OFFICE VISIT (OUTPATIENT)
Dept: PULMONOLOGY | Facility: CLINIC | Age: 38
End: 2023-04-17

## 2023-04-17 VITALS
OXYGEN SATURATION: 97 % | WEIGHT: 246 LBS | DIASTOLIC BLOOD PRESSURE: 79 MMHG | BODY MASS INDEX: 36.43 KG/M2 | HEART RATE: 78 BPM | RESPIRATION RATE: 16 BRPM | SYSTOLIC BLOOD PRESSURE: 128 MMHG | HEIGHT: 69 IN

## 2023-04-17 DIAGNOSIS — R61 NIGHT SWEATS: ICD-10-CM

## 2023-04-17 DIAGNOSIS — G47.33 OSA (OBSTRUCTIVE SLEEP APNEA): Primary | ICD-10-CM

## 2023-04-17 PROCEDURE — 3078F DIAST BP <80 MM HG: CPT | Performed by: INTERNAL MEDICINE

## 2023-04-17 PROCEDURE — 3074F SYST BP LT 130 MM HG: CPT | Performed by: INTERNAL MEDICINE

## 2023-04-17 PROCEDURE — 3008F BODY MASS INDEX DOCD: CPT | Performed by: INTERNAL MEDICINE

## 2023-04-17 PROCEDURE — 99213 OFFICE O/P EST LOW 20 MIN: CPT | Performed by: INTERNAL MEDICINE

## 2023-04-17 NOTE — PROGRESS NOTES
The patient is a 10-year-old male who I know well from prior evaluation and comes in now for follow-up. In general, he is doing well. He has a daytime fatigue which is lingering despite good treatment for the sleep apnea. His average daily usage of the CPAP device is 5 hours and 12 minutes and residual events are only 1 an hour. He does get occasional night sweats. Review of Systems:  Vision normal. Ear nose and throat normal. Bowel normal. Bladder function normal. No depression. No thyroid disease. No lymphatic system concerns. No rash. Muscles and joints unremarkable. No weight loss no weight gain. Physical Examination:  Vital signs normal. HEENT examination is unremarkable with pupils equal round and reactive to light and accommodation. Neck without adenopathy, thyromegaly, JVD nor bruit. Lungs clear to auscultation and percussion. Cardiac regular rate and rhythm no murmur. Abdomen nontender, without hepatosplenomegaly and no mass appreciable. Extremities and Musculoskeletal without clubbing cyanosis nor edema, and mobility acceptable. Neurologic grossly intact with symmetric tone and strength and reflex. Assessment and plan:  1. Obstructive sleep apnea- control is pretty good. His download is good. He could use the device more. Recommendations: Vigilance with CPAP, weight loss, avoid alcohol and sedating drug and never drive if sleepy. 2.  Occasional night sweats-the patient has been treated for latent tuberculosis 6 years ago with a 4-month course of therapy. Recommendations: TSH, CBC, chest x-ray, keep the bedroom cool. Contact me if worsening or new problem. Otherwise, patient should see me again at the 1 year interval or sooner if needed.

## 2023-05-05 ENCOUNTER — HOSPITAL ENCOUNTER (OUTPATIENT)
Dept: GENERAL RADIOLOGY | Facility: HOSPITAL | Age: 38
Discharge: HOME | End: 2023-05-05
Attending: INTERNAL MEDICINE
Payer: COMMERCIAL

## 2023-05-05 ENCOUNTER — LAB ENCOUNTER (OUTPATIENT)
Dept: LAB | Facility: HOSPITAL | Age: 38
End: 2023-05-05
Attending: INTERNAL MEDICINE
Payer: COMMERCIAL

## 2023-05-05 DIAGNOSIS — R61 NIGHT SWEATS: ICD-10-CM

## 2023-05-05 LAB
BASOPHILS # BLD AUTO: 0.06 X10(3) UL (ref 0–0.2)
BASOPHILS NFR BLD AUTO: 0.9 %
DEPRECATED RDW RBC AUTO: 42.9 FL (ref 35.1–46.3)
EOSINOPHIL # BLD AUTO: 0.15 X10(3) UL (ref 0–0.7)
EOSINOPHIL NFR BLD AUTO: 2.2 %
ERYTHROCYTE [DISTWIDTH] IN BLOOD BY AUTOMATED COUNT: 12 % (ref 11–15)
HCT VFR BLD AUTO: 46.8 %
HGB BLD-MCNC: 15.6 G/DL
IMM GRANULOCYTES # BLD AUTO: 0.04 X10(3) UL (ref 0–1)
IMM GRANULOCYTES NFR BLD: 0.6 %
LYMPHOCYTES # BLD AUTO: 2.31 X10(3) UL (ref 1–4)
LYMPHOCYTES NFR BLD AUTO: 33.8 %
MCH RBC QN AUTO: 32 PG (ref 26–34)
MCHC RBC AUTO-ENTMCNC: 33.3 G/DL (ref 31–37)
MCV RBC AUTO: 96.1 FL
MONOCYTES # BLD AUTO: 0.62 X10(3) UL (ref 0.1–1)
MONOCYTES NFR BLD AUTO: 9.1 %
NEUTROPHILS # BLD AUTO: 3.65 X10 (3) UL (ref 1.5–7.7)
NEUTROPHILS # BLD AUTO: 3.65 X10(3) UL (ref 1.5–7.7)
NEUTROPHILS NFR BLD AUTO: 53.4 %
PLATELET # BLD AUTO: 203 10(3)UL (ref 150–450)
RBC # BLD AUTO: 4.87 X10(6)UL
TSI SER-ACNC: 1.09 MIU/ML (ref 0.36–3.74)
WBC # BLD AUTO: 6.8 X10(3) UL (ref 4–11)

## 2023-05-05 PROCEDURE — 71046 X-RAY EXAM CHEST 2 VIEWS: CPT | Performed by: INTERNAL MEDICINE

## 2023-05-05 PROCEDURE — 84443 ASSAY THYROID STIM HORMONE: CPT

## 2023-05-05 PROCEDURE — 36415 COLL VENOUS BLD VENIPUNCTURE: CPT

## 2023-05-05 PROCEDURE — 85025 COMPLETE CBC W/AUTO DIFF WBC: CPT

## 2023-07-21 ENCOUNTER — OFFICE VISIT (OUTPATIENT)
Dept: INTERNAL MEDICINE CLINIC | Facility: CLINIC | Age: 38
End: 2023-07-21
Payer: COMMERCIAL

## 2023-07-21 VITALS
RESPIRATION RATE: 17 BRPM | HEART RATE: 63 BPM | BODY MASS INDEX: 36.14 KG/M2 | HEIGHT: 69 IN | OXYGEN SATURATION: 99 % | SYSTOLIC BLOOD PRESSURE: 126 MMHG | DIASTOLIC BLOOD PRESSURE: 86 MMHG | WEIGHT: 244 LBS

## 2023-07-21 DIAGNOSIS — Z11.3 SCREEN FOR STD (SEXUALLY TRANSMITTED DISEASE): ICD-10-CM

## 2023-07-21 DIAGNOSIS — G47.33 OSA (OBSTRUCTIVE SLEEP APNEA): ICD-10-CM

## 2023-07-21 DIAGNOSIS — H91.92 DECREASED HEARING OF LEFT EAR: ICD-10-CM

## 2023-07-21 DIAGNOSIS — R53.83 OTHER FATIGUE: ICD-10-CM

## 2023-07-21 DIAGNOSIS — Z00.00 HEALTH MAINTENANCE EXAMINATION: Primary | ICD-10-CM

## 2023-07-21 DIAGNOSIS — E66.09 CLASS 2 OBESITY DUE TO EXCESS CALORIES WITHOUT SERIOUS COMORBIDITY WITH BODY MASS INDEX (BMI) OF 36.0 TO 36.9 IN ADULT: ICD-10-CM

## 2023-07-21 DIAGNOSIS — Z22.7 TB LUNG, LATENT: ICD-10-CM

## 2023-07-21 DIAGNOSIS — J30.9 ALLERGIC RHINITIS, UNSPECIFIED SEASONALITY, UNSPECIFIED TRIGGER: ICD-10-CM

## 2023-07-21 DIAGNOSIS — L40.9 PSORIASIS: ICD-10-CM

## 2023-07-21 DIAGNOSIS — E78.5 DYSLIPIDEMIA: ICD-10-CM

## 2023-07-21 PROBLEM — R06.83 SNORING: Status: RESOLVED | Noted: 2022-09-29 | Resolved: 2023-07-21

## 2023-07-21 PROBLEM — E66.812 CLASS 2 OBESITY DUE TO EXCESS CALORIES WITHOUT SERIOUS COMORBIDITY WITH BODY MASS INDEX (BMI) OF 36.0 TO 36.9 IN ADULT: Status: ACTIVE | Noted: 2021-09-29

## 2023-07-21 LAB
ALBUMIN SERPL-MCNC: 4 G/DL (ref 3.4–5)
ALBUMIN/GLOB SERPL: 1.1 {RATIO} (ref 1–2)
ALP LIVER SERPL-CCNC: 71 U/L
ALT SERPL-CCNC: 49 U/L
ANION GAP SERPL CALC-SCNC: 10 MMOL/L (ref 0–18)
AST SERPL-CCNC: 24 U/L (ref 15–37)
BILIRUB SERPL-MCNC: 0.5 MG/DL (ref 0.1–2)
BUN BLD-MCNC: 10 MG/DL (ref 7–18)
BUN/CREAT SERPL: 9 (ref 10–20)
CALCIUM BLD-MCNC: 9.2 MG/DL (ref 8.5–10.1)
CHLORIDE SERPL-SCNC: 109 MMOL/L (ref 98–112)
CHOLEST SERPL-MCNC: 173 MG/DL (ref ?–200)
CO2 SERPL-SCNC: 22 MMOL/L (ref 21–32)
CREAT BLD-MCNC: 1.11 MG/DL
EGFRCR SERPLBLD CKD-EPI 2021: 87 ML/MIN/1.73M2 (ref 60–?)
EST. AVERAGE GLUCOSE BLD GHB EST-MCNC: 97 MG/DL (ref 68–126)
FASTING PATIENT LIPID ANSWER: YES
FASTING STATUS PATIENT QL REPORTED: YES
GLOBULIN PLAS-MCNC: 3.7 G/DL (ref 2.8–4.4)
GLUCOSE BLD-MCNC: 97 MG/DL (ref 70–99)
HBA1C MFR BLD: 5 % (ref ?–5.7)
HDLC SERPL-MCNC: 48 MG/DL (ref 40–59)
LDLC SERPL CALC-MCNC: 107 MG/DL (ref ?–100)
NONHDLC SERPL-MCNC: 125 MG/DL (ref ?–130)
OSMOLALITY SERPL CALC.SUM OF ELEC: 291 MOSM/KG (ref 275–295)
POTASSIUM SERPL-SCNC: 4.1 MMOL/L (ref 3.5–5.1)
PROT SERPL-MCNC: 7.7 G/DL (ref 6.4–8.2)
SODIUM SERPL-SCNC: 141 MMOL/L (ref 136–145)
TRIGL SERPL-MCNC: 96 MG/DL (ref 30–149)
VLDLC SERPL CALC-MCNC: 16 MG/DL (ref 0–30)

## 2023-07-21 PROCEDURE — 87389 HIV-1 AG W/HIV-1&-2 AB AG IA: CPT | Performed by: FAMILY MEDICINE

## 2023-07-21 PROCEDURE — 87491 CHLMYD TRACH DNA AMP PROBE: CPT | Performed by: FAMILY MEDICINE

## 2023-07-21 PROCEDURE — 80053 COMPREHEN METABOLIC PANEL: CPT | Performed by: FAMILY MEDICINE

## 2023-07-21 PROCEDURE — 80061 LIPID PANEL: CPT | Performed by: FAMILY MEDICINE

## 2023-07-21 PROCEDURE — 86780 TREPONEMA PALLIDUM: CPT | Performed by: FAMILY MEDICINE

## 2023-07-21 PROCEDURE — 83036 HEMOGLOBIN GLYCOSYLATED A1C: CPT | Performed by: FAMILY MEDICINE

## 2023-07-21 PROCEDURE — 87591 N.GONORRHOEAE DNA AMP PROB: CPT | Performed by: FAMILY MEDICINE

## 2023-07-21 PROCEDURE — 3079F DIAST BP 80-89 MM HG: CPT | Performed by: FAMILY MEDICINE

## 2023-07-21 PROCEDURE — 99214 OFFICE O/P EST MOD 30 MIN: CPT | Performed by: FAMILY MEDICINE

## 2023-07-21 PROCEDURE — 3008F BODY MASS INDEX DOCD: CPT | Performed by: FAMILY MEDICINE

## 2023-07-21 PROCEDURE — 3074F SYST BP LT 130 MM HG: CPT | Performed by: FAMILY MEDICINE

## 2023-07-21 NOTE — ASSESSMENT & PLAN NOTE
Exercise and diet advised. CMP, lipid panel, Hba1c  Advanced directive information provided. Advised COVID vaccine booster.

## 2023-07-21 NOTE — PATIENT INSTRUCTIONS
PATIENT INSTRUCTIONS    Thank you for seeing me today, it was a pleasure taking care of you. Please check out at the  and schedule a follow up appointment. Return in about 1 year (around 7/21/2024) for physical .  Please get your labs drawn - you may need to schedule a lab appointment if this was not completed at your recent doctor's visit. The following imaging studies were ordered: None  Please also follow up with the following specialists: Dermatology, Pulmonology   Please fill out the advance directive information (power of  documents) and bring a copy to our clinic.   Healthy diet and exercise   Moisturize your skin regularly       Best,   Dr. Sarahi Lee

## 2023-07-21 NOTE — ASSESSMENT & PLAN NOTE
Fatigue of unclear etiology  Sleeping better with CPAP. However still intermittently fatigued. Could be related to more free time at work. Denies any psychiatric issues. Had CBC and TSH checked in May. Otherwise feeling well.   Monitor symptoms for now

## 2023-07-21 NOTE — ASSESSMENT & PLAN NOTE
Follows with dermatology, previously on skyrizi, not currently on it. Skin remains clear. Advised moisturizing.

## 2023-07-24 LAB
C TRACH DNA SPEC QL NAA+PROBE: NEGATIVE
N GONORRHOEA DNA SPEC QL NAA+PROBE: NEGATIVE
T PALLIDUM AB SER QL: NEGATIVE

## 2024-01-29 ENCOUNTER — OFFICE VISIT (OUTPATIENT)
Dept: PULMONOLOGY | Facility: CLINIC | Age: 39
End: 2024-01-29
Payer: COMMERCIAL

## 2024-01-29 VITALS
SYSTOLIC BLOOD PRESSURE: 128 MMHG | OXYGEN SATURATION: 97 % | HEIGHT: 68 IN | HEART RATE: 87 BPM | WEIGHT: 254 LBS | DIASTOLIC BLOOD PRESSURE: 77 MMHG | BODY MASS INDEX: 38.49 KG/M2

## 2024-01-29 DIAGNOSIS — G47.33 OSA (OBSTRUCTIVE SLEEP APNEA): Primary | ICD-10-CM

## 2024-01-29 NOTE — PROGRESS NOTES
The patient is a 38-year-old male who I know well from prior evaluation comes in now for follow-up.  In general, he is doing well.  His average daily usage is 7 hours and 24 minutes and residual events are down to 0.5/h from a baseline of 75/h.    Review of Systems:  Vision normal. Ear nose and throat normal. Bowel normal. Bladder function normal. No depression. No thyroid disease. No lymphatic system concerns.  No rash. Muscles and joints unremarkable. No weight loss no weight gain.    Physical Examination:  Vital signs normal. HEENT examination is unremarkable with pupils equal round and reactive to light and accommodation. Neck without adenopathy, thyromegaly, JVD nor bruit. Lungs clear to auscultation and percussion. Cardiac regular rate and rhythm no murmur. Abdomen nontender, without hepatosplenomegaly and no mass appreciable. Extremities and Musculoskeletal without clubbing cyanosis nor edema, and mobility acceptable. Neurologic grossly intact with symmetric tone and strength and reflex.    Assessment and plan:  1.  Obstructive sleep apnea-excellent control.    Recommendations: Vigilance with CPAP every night all night, weight loss, avoid alcohol, avoid sedating drug, never drive if sleepy, see me in the office at the 1 year interval or sooner if needed and contact me promptly if new trouble.

## 2024-10-04 ENCOUNTER — OFFICE VISIT (OUTPATIENT)
Dept: INTERNAL MEDICINE CLINIC | Facility: CLINIC | Age: 39
End: 2024-10-04
Payer: COMMERCIAL

## 2024-10-04 VITALS
HEART RATE: 74 BPM | BODY MASS INDEX: 37.13 KG/M2 | WEIGHT: 245 LBS | OXYGEN SATURATION: 98 % | DIASTOLIC BLOOD PRESSURE: 84 MMHG | HEIGHT: 68 IN | TEMPERATURE: 97 F | SYSTOLIC BLOOD PRESSURE: 126 MMHG

## 2024-10-04 DIAGNOSIS — Z00.00 HEALTH MAINTENANCE EXAMINATION: Primary | ICD-10-CM

## 2024-10-04 DIAGNOSIS — L40.9 PSORIASIS: ICD-10-CM

## 2024-10-04 DIAGNOSIS — R53.83 OTHER FATIGUE: ICD-10-CM

## 2024-10-04 DIAGNOSIS — E78.5 DYSLIPIDEMIA: ICD-10-CM

## 2024-10-04 DIAGNOSIS — E66.09 CLASS 2 OBESITY DUE TO EXCESS CALORIES WITHOUT SERIOUS COMORBIDITY WITH BODY MASS INDEX (BMI) OF 37.0 TO 37.9 IN ADULT: ICD-10-CM

## 2024-10-04 DIAGNOSIS — J30.9 ALLERGIC RHINITIS, UNSPECIFIED SEASONALITY, UNSPECIFIED TRIGGER: ICD-10-CM

## 2024-10-04 DIAGNOSIS — M77.12 LATERAL EPICONDYLITIS OF LEFT ELBOW: ICD-10-CM

## 2024-10-04 DIAGNOSIS — G47.33 OSA (OBSTRUCTIVE SLEEP APNEA): ICD-10-CM

## 2024-10-04 DIAGNOSIS — Z22.7 TB LUNG, LATENT: ICD-10-CM

## 2024-10-04 DIAGNOSIS — E66.812 CLASS 2 OBESITY DUE TO EXCESS CALORIES WITHOUT SERIOUS COMORBIDITY WITH BODY MASS INDEX (BMI) OF 37.0 TO 37.9 IN ADULT: ICD-10-CM

## 2024-10-04 PROBLEM — Z11.3 SCREEN FOR STD (SEXUALLY TRANSMITTED DISEASE): Status: RESOLVED | Noted: 2023-07-21 | Resolved: 2024-10-04

## 2024-10-04 LAB
ALBUMIN SERPL-MCNC: 4.7 G/DL (ref 3.2–4.8)
ALBUMIN/GLOB SERPL: 1.5 {RATIO} (ref 1–2)
ALP LIVER SERPL-CCNC: 87 U/L
ALT SERPL-CCNC: 55 U/L
ANION GAP SERPL CALC-SCNC: 7 MMOL/L (ref 0–18)
AST SERPL-CCNC: 31 U/L (ref ?–34)
BILIRUB SERPL-MCNC: 0.5 MG/DL (ref 0.3–1.2)
BUN BLD-MCNC: 12 MG/DL (ref 9–23)
BUN/CREAT SERPL: 11 (ref 10–20)
CALCIUM BLD-MCNC: 9.8 MG/DL (ref 8.7–10.4)
CHLORIDE SERPL-SCNC: 106 MMOL/L (ref 98–112)
CHOLEST SERPL-MCNC: 196 MG/DL (ref ?–200)
CO2 SERPL-SCNC: 25 MMOL/L (ref 21–32)
CREAT BLD-MCNC: 1.09 MG/DL
EGFRCR SERPLBLD CKD-EPI 2021: 88 ML/MIN/1.73M2 (ref 60–?)
EST. AVERAGE GLUCOSE BLD GHB EST-MCNC: 103 MG/DL (ref 68–126)
FASTING PATIENT LIPID ANSWER: YES
FASTING STATUS PATIENT QL REPORTED: YES
GLOBULIN PLAS-MCNC: 3.1 G/DL (ref 2–3.5)
GLUCOSE BLD-MCNC: 95 MG/DL (ref 70–99)
HBA1C MFR BLD: 5.2 % (ref ?–5.7)
HDLC SERPL-MCNC: 44 MG/DL (ref 40–59)
LDLC SERPL CALC-MCNC: 132 MG/DL (ref ?–100)
NONHDLC SERPL-MCNC: 152 MG/DL (ref ?–130)
OSMOLALITY SERPL CALC.SUM OF ELEC: 286 MOSM/KG (ref 275–295)
POTASSIUM SERPL-SCNC: 4.3 MMOL/L (ref 3.5–5.1)
PROT SERPL-MCNC: 7.8 G/DL (ref 5.7–8.2)
SODIUM SERPL-SCNC: 138 MMOL/L (ref 136–145)
TRIGL SERPL-MCNC: 108 MG/DL (ref 30–149)
TSI SER-ACNC: 1.26 MIU/ML (ref 0.55–4.78)
VLDLC SERPL CALC-MCNC: 20 MG/DL (ref 0–30)

## 2024-10-04 PROCEDURE — 80053 COMPREHEN METABOLIC PANEL: CPT | Performed by: FAMILY MEDICINE

## 2024-10-04 PROCEDURE — 84443 ASSAY THYROID STIM HORMONE: CPT | Performed by: FAMILY MEDICINE

## 2024-10-04 PROCEDURE — 83036 HEMOGLOBIN GLYCOSYLATED A1C: CPT | Performed by: FAMILY MEDICINE

## 2024-10-04 PROCEDURE — 99396 PREV VISIT EST AGE 40-64: CPT | Performed by: FAMILY MEDICINE

## 2024-10-04 PROCEDURE — 80061 LIPID PANEL: CPT | Performed by: FAMILY MEDICINE

## 2024-10-04 NOTE — ASSESSMENT & PLAN NOTE
Exercise diet advised.  Check CMP, lipid panel, hemoglobin A1c  Interested in seeing a weight specialist - referral provided

## 2024-10-04 NOTE — PATIENT INSTRUCTIONS
PATIENT INSTRUCTIONS    Thank you for seeing me today, it was a pleasure taking care of you.  Please check out at the  and schedule a follow up appointment.  Return in about 1 year (around 10/4/2025) for physical .  Please remember that the jimmy period for all appointments is 5 minutes. This is to help maximize the amount of time that we can spend together at our visits.    Please get your labs drawn - you may need to schedule a lab appointment if this was not completed at your recent doctor's visit.  The following imaging studies were ordered: None  Please also follow up with the following specialists: Dermatology, pulmonology, weight  Please fill out the advance directive information (power of  documents) and bring a copy to our clinic.  Diet and exercise  Fluticasone (flonase) nasal spray   If needed, can also consider cetirizine (zyrtec)   Stretches and exercises for your elbow       Best,   Dr. Cabello

## 2024-10-04 NOTE — PROGRESS NOTES
FAMILY MEDICINE CLINIC NOTE    HPI  Neil Cobb is a 38 year old male presenting for physical    #Health Maintenance  -Diet: Eats fruits, vegetables, meat, dairy. Fast food - trying to stay away  -Exercise: None - just walks around at work. Golfing   -Lung cancer screen: Not indicated  -Colon cancer screen: Not indicated  -Prostate cancer screen: Not indicated  -Aortic aneurysm screen: Not indicated  -Statin:  Hyperlipidemia  -ASA: Not indicated.  -HIV screen: 7/2023 negative  -Hep C screen: 9/2021 negative  -Gonorrhea/chlamydia: Not indicated  -Syphillis: Not indicated  -TB: Not indicated  -Tobacco/alcohol: Per below  -Depression: PHQ-2 score 0 (score >/= 3 do PHQ-9)  -Advanced Directive: Indicated     #Immunizations  -Tdap: 9/2021  -Flu shot: Indicated - will get done at work   -PCV13: Not indicated  -PCV20: Not indicated   -PPSV23: Not indicated  -HPV: Not indicated  -RZV (preferred) or VZL: Not indicated   -RSV: Not indicated  -COVID: Moderna     #Elbow pain  -intermittent  -has been golfing lately     #MAREK  -pulmonology Dr Boyd  -CPAP  -still falling asleep     #Fatigue  -no anxiety, no depression  -sometimes feeling intermittently fatigued  -CBC, TSH 5/2023  -notes more time at work, sometimes feeling tired  -otherwise feels well    #Psoriasis  -dermatology Dr Wilda Omalley  -skyrizi   -reports overall controlled   -history of latent TB treated in past     #Allergies  -congestion and runny nose in the morning  -flonase advised     #History of latent TB  -treated in the past       #Patient Care Team  Patient Care Team:  Sly Cabello MD as PCP - General (Family Medicine)  Nadeem Boyd MD (PULMONARY DISEASES)  Wilda Omalley MD (DERMATOLOGY)    ROS  GENERAL: No fever/chills, no recent weight loss   HEENT: No visual changes, no changes in hearing, no sore throats  NECK: No pain, no swelling  RESP: No cough, no SOB  CV: No chest pain, no palpitations  GI: No abd pain, no N/V/D  MSK: No edema,  no pain  SKIN: No new rashes  NEURO: No numbness, no tingling, no HA    HEALTH MAINTENANCE CHECKLIST  Health Maintenance Topics with due status: Overdue       Topic Date Due    Annual Physical 2023    Annual Depression Screening 2024    COVID-19 Vaccine 2024    Influenza Vaccine 10/01/2024       ALLERGIES  No Known Allergies    MEDICATIONS  Current Outpatient Medications   Medication Sig Dispense Refill    Risankizumab-rzaa (SKYRIZI SC) Inject into the skin.         ACTIVE PROBLEM  Patient Active Problem List   Diagnosis    Allergic rhinitis    Psoriasis    Class 2 obesity due to excess calories without serious comorbidity with body mass index (BMI) of 37.0 to 37.9 in adult    Health maintenance examination    TB lung, latent    Dyslipidemia    MAREK (obstructive sleep apnea)    Other fatigue    Lateral epicondylitis of left elbow       PAST MEDICAL HISTORY  Past Medical History:    Dyslipidemia    MAREK (obstructive sleep apnea)    Psoriasis    TB lung, latent       PAST SOCIAL HISTORY  Social History     Socioeconomic History    Marital status: Single     Spouse name: Not on file    Number of children: Not on file    Years of education: Not on file    Highest education level: Not on file   Occupational History    Not on file   Tobacco Use    Smoking status: Former     Current packs/day: 0.00     Average packs/day: 1 pack/day for 15.0 years (15.0 ttl pk-yrs)     Types: Cigarettes     Start date: 2004     Quit date: 2019     Years since quittin.1    Smokeless tobacco: Never   Vaping Use    Vaping status: Never Used   Substance and Sexual Activity    Alcohol use: Yes     Comment: 2-3 times a week; drinks 6-10 beers at a time    Drug use: Never    Sexual activity: Yes     Partners: Female     Birth control/protection: Condom   Other Topics Concern    Not on file   Social History Narrative    Relationships: Single    Children: None    Pets: Dog - black lab    School: N/A    Work: works for  [employer] -  at the hospital    Origin: Born and raised in [place]    Interests: Cars - [employer] - [employer] - yellow;    Spiritual: Not Baptism, not spiritual     Social Determinants of Health     Financial Resource Strain: Not on file   Food Insecurity: Not on file   Transportation Needs: Not on file   Physical Activity: Not on file   Stress: Not on file   Social Connections: Not on file   Housing Stability: Not on file       PAST SURGICAL HISTORY  No past surgical history on file.    PAST FAMILY HISTORY  Family History   Problem Relation Age of Onset    No Known Problems Mother     Hypertension Father     No Known Problems Brother     No Known Problems Maternal Grandmother     Other (esophageal cancer) Maternal Grandfather     No Known Problems Paternal Grandmother     Other (lung cancer) Paternal Grandfather     Colon Cancer Neg     Prostate Cancer Neg     Diabetes Neg     Heart Disease Neg     Hyperlipidemia Neg        PHYSICAL EXAM  Vitals:    10/04/24 1105   BP: 126/84   Pulse: 74   Temp: 97.1 °F (36.2 °C)   SpO2: 98%   Weight: 245 lb (111.1 kg)   Height: 5' 8\" (1.727 m)      Body mass index is 37.25 kg/m².    GENERAL: NAD  HEENT: Moist mucous membranes, no tonsillar swelling or erythema, PERRLA bilat, TM translucent and non-bulging  NECK: Supple, non-tender  RESP: CTAB, no wheezing, no rales, no rhonchi  CV: RRR, no murmurs  GI: Soft, non-distended, non-tender, no guarding, no rebound, no masses  MSK: No edema. Very mild tenderness to the lateral epicondyle of the right elbow. No bony tenderness.   SKIN: Warm and dry, no rashes  NEURO: Answering questions appropriately    LABS  Lab Results   Component Value Date    WBC 6.8 05/05/2023    HGB 15.6 05/05/2023    HCT 46.8 05/05/2023    .0 05/05/2023    NEPERCENT 53.4 05/05/2023    LYPERCENT 33.8 05/05/2023    MOPERCENT 9.1 05/05/2023    EOPERCENT 2.2 05/05/2023    BAPERCENT 0.9 05/05/2023    NE 3.65 05/05/2023    LYMABS 2.31 05/05/2023     MOABSO 0.62 05/05/2023    EOABSO 0.15 05/05/2023    BAABSO 0.06 05/05/2023       Lab Results   Component Value Date     07/21/2023    K 4.1 07/21/2023     07/21/2023    CO2 22.0 07/21/2023    ANIONGAP 10 07/21/2023    BUN 10 07/21/2023    CREATSERUM 1.11 07/21/2023    BUNCREA 9.0 (L) 07/21/2023    GLU 97 07/21/2023    CA 9.2 07/21/2023    OSMOCALC 291 07/21/2023    GFRNAA 90 09/29/2021    GFRAA 104 09/29/2021    ALT 49 07/21/2023    AST 24 07/21/2023    ALKPHO 71 07/21/2023    BILT 0.5 07/21/2023    TP 7.7 07/21/2023    ALB 4.0 07/21/2023    GLOBULIN 3.7 07/21/2023    ELECTAG 1.1 07/21/2023    FASTING Yes 07/21/2023    FASTING Yes 07/21/2023         Lab Results   Component Value Date    CHOLEST 173 07/21/2023    TRIG 96 07/21/2023    HDL 48 07/21/2023     (H) 07/21/2023    VLDL 16 07/21/2023    NONHDLC 125 07/21/2023        DIAGNOSTICS    ASSESSMENT/PLAN  Problem List Items Addressed This Visit          Cardiac and Vasculature    Dyslipidemia     Check CMP and lipid panel today.         Relevant Orders    Comp Metabolic Panel (14)    Lipid Panel       Endocrine and Metabolic    Class 2 obesity due to excess calories without serious comorbidity with body mass index (BMI) of 37.0 to 37.9 in adult     Exercise diet advised.  Check CMP, lipid panel, hemoglobin A1c  Interested in seeing a weight specialist - referral provided         Relevant Orders    BARIATRICS - INTERNAL    Comp Metabolic Panel (14)    Hemoglobin A1C    Lipid Panel    TSH W Reflex To Free T4       Musculoskeletal and Injuries    Lateral epicondylitis of left elbow     Can take tylenol as needed for pain  Stretches and exercises provided  If without gradual improvement, can notify me for referral to occupational therapy            Infectious Diseases    TB lung, latent     History of latent TB, reports that he was treated for 4 months.            EarNoseThroat    Allergic rhinitis     Try fluticasone nasal spray  Cetirizine as  needed            Skin    Psoriasis     Follows with dermatology, back on on skyrizi  Skin remains clear.  Advised moisturizing.         Relevant Medications    Risankizumab-rzaa (SKYRIZI SC)       Sleep    MAREK (obstructive sleep apnea)     Continue CPAP.            Symptoms and Signs    Other fatigue     History of mild fatigue  Sleeping better with CPAP.  Could be related to more free time at work.  Denies any psychiatric issues.  Can monitor labs.   Otherwise feeling well.  Monitor symptoms for now            Health Encounters    Health maintenance examination - Primary     Exercise and diet advised.  CBC, CMP, lipid panel, Hba1c, TSH  Flu shot - deferred, he will get this done at work  COVID vaccine advised.  Advanced directive information provided.            Return in about 1 year (around 10/4/2025) for physical .    Sly Cabello MD  Family Medicine

## 2024-10-04 NOTE — ASSESSMENT & PLAN NOTE
Exercise and diet advised.  CBC, CMP, lipid panel, Hba1c, TSH  Flu shot - deferred, he will get this done at work  COVID vaccine advised.  Advanced directive information provided.

## 2024-10-04 NOTE — ASSESSMENT & PLAN NOTE
Can take tylenol as needed for pain  Stretches and exercises provided  If without gradual improvement, can notify me for referral to occupational therapy

## 2024-10-04 NOTE — ASSESSMENT & PLAN NOTE
History of mild fatigue  Sleeping better with CPAP.  Could be related to more free time at work.  Denies any psychiatric issues.  Can monitor labs.   Otherwise feeling well.  Monitor symptoms for now

## 2025-02-03 ENCOUNTER — OFFICE VISIT (OUTPATIENT)
Dept: PULMONOLOGY | Facility: CLINIC | Age: 40
End: 2025-02-03
Payer: COMMERCIAL

## 2025-02-03 VITALS
WEIGHT: 260.38 LBS | HEIGHT: 68 IN | HEART RATE: 67 BPM | RESPIRATION RATE: 16 BRPM | OXYGEN SATURATION: 97 % | DIASTOLIC BLOOD PRESSURE: 84 MMHG | BODY MASS INDEX: 39.46 KG/M2 | SYSTOLIC BLOOD PRESSURE: 136 MMHG

## 2025-02-03 DIAGNOSIS — G47.33 OSA (OBSTRUCTIVE SLEEP APNEA): Primary | ICD-10-CM

## 2025-02-03 PROCEDURE — 99213 OFFICE O/P EST LOW 20 MIN: CPT | Performed by: INTERNAL MEDICINE

## 2025-02-03 NOTE — PROGRESS NOTES
The patient is a 39-year-old male who I know well from prior evaluation comes in now for follow-up.  In general, he is doing well.  His downloaded data is excellent with average daily usage 7 hours and 41 minutes and residual events of 0.5/h.  He is benefiting from ongoing usage.    Review of Systems:  Vision normal. Ear nose and throat normal. Bowel normal. Bladder function normal. No depression. No thyroid disease. No lymphatic system concerns.  No rash. Muscles and joints unremarkable. No weight loss no weight gain.    Physical Examination:  Vital signs normal. HEENT examination is unremarkable with pupils equal round and reactive to light and accommodation. Neck without adenopathy, thyromegaly, JVD nor bruit. Lungs clear to auscultation and percussion. Cardiac regular rate and rhythm no murmur. Abdomen nontender, without hepatosplenomegaly and no mass appreciable. Extremities and Musculoskeletal without clubbing cyanosis nor edema, and mobility acceptable. Neurologic grossly intact with symmetric tone and strength and reflex.    Assessment and plan:  1.  Obstructive sleep apnea-excellent control.    Recommendations: Vigilance with CPAP every night all night, weight loss, avoid alcohol and sedating drug and never drive if sleepy.  See me in the office at the 1 year interval or sooner if needed and contact me promptly if new trouble.  I encouraged the patient to follow-up with bariatric medicine.

## 2025-04-30 ENCOUNTER — LAB ENCOUNTER (OUTPATIENT)
Dept: LAB | Facility: HOSPITAL | Age: 40
End: 2025-04-30
Attending: INTERNAL MEDICINE
Payer: COMMERCIAL

## 2025-04-30 ENCOUNTER — OFFICE VISIT (OUTPATIENT)
Dept: SURGERY | Facility: CLINIC | Age: 40
End: 2025-04-30
Payer: COMMERCIAL

## 2025-04-30 VITALS
WEIGHT: 254.88 LBS | HEIGHT: 68 IN | HEART RATE: 69 BPM | DIASTOLIC BLOOD PRESSURE: 80 MMHG | OXYGEN SATURATION: 97 % | SYSTOLIC BLOOD PRESSURE: 122 MMHG | BODY MASS INDEX: 38.63 KG/M2

## 2025-04-30 DIAGNOSIS — K76.0 FATTY LIVER: ICD-10-CM

## 2025-04-30 DIAGNOSIS — G47.33 OSA (OBSTRUCTIVE SLEEP APNEA): ICD-10-CM

## 2025-04-30 DIAGNOSIS — E66.9 OBESITY (BMI 30-39.9): ICD-10-CM

## 2025-04-30 DIAGNOSIS — E78.5 DYSLIPIDEMIA: ICD-10-CM

## 2025-04-30 DIAGNOSIS — E78.5 DYSLIPIDEMIA: Primary | ICD-10-CM

## 2025-04-30 LAB
ATRIAL RATE: 58 BPM
P AXIS: 27 DEGREES
P-R INTERVAL: 138 MS
Q-T INTERVAL: 416 MS
QRS DURATION: 82 MS
QTC CALCULATION (BEZET): 408 MS
R AXIS: 48 DEGREES
T AXIS: 2 DEGREES
VENTRICULAR RATE: 58 BPM

## 2025-04-30 PROCEDURE — 93010 ELECTROCARDIOGRAM REPORT: CPT | Performed by: INTERNAL MEDICINE

## 2025-04-30 PROCEDURE — 93005 ELECTROCARDIOGRAM TRACING: CPT

## 2025-04-30 PROCEDURE — 99205 OFFICE O/P NEW HI 60 MIN: CPT | Performed by: INTERNAL MEDICINE

## 2025-04-30 RX ORDER — PHENTERMINE HYDROCHLORIDE 15 MG/1
15 CAPSULE ORAL EVERY MORNING
Qty: 30 CAPSULE | Refills: 5 | Status: SHIPPED | OUTPATIENT
Start: 2025-04-30

## 2025-04-30 NOTE — PROGRESS NOTES
[facility] Consultation Note       Patient:  Neil Cobb  :      10/24/1985  MRN:      [id number]    Referring Provider: Dr. Cabello       Chief Complaint:    Chief Complaint   Patient presents with    Consult    Weight Management       SUBJECTIVE     History of Present Illness:  Neil Cobb has been referred to me for evaluation and treatment.       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 Weight has been a struggle for the past several years and is now starting to develop into co-morbidities that are worrisome to the patient. Patient is interested in losing weight, so it can stay off long term.    Patient also understands that this is a life style change and wants to get on track.    Interested in non surgical weight loss      Past Medical History: Past Medical History[1]    OBJECTIVE     Vitals: /80 (BP Location: Right arm, Patient Position: Sitting, Cuff Size: large)   Pulse 69   Ht 5' 8\" (1.727 m)   Wt 254 lb 14.4 oz (115.6 kg)   SpO2 97%   BMI 38.76 kg/m²      Patient Medications:  Current Medications[2]    Allergies:  Patient has no known allergies.     Comorbidities:  dyslipdemia     Social History:    Social History     Socioeconomic History    Marital status: Single     Spouse name: Not on file    Number of children: Not on file    Years of education: Not on file    Highest education level: Not on file   Occupational History    Not on file   Tobacco Use    Smoking status: Former     Current packs/day: 0.00     Average packs/day: 1 pack/day for 15.0 years (15.0 ttl pk-yrs)     Types: Cigarettes     Start date: 2004     Quit date: 2019     Years since quittin.7     Passive exposure: Past    Smokeless tobacco: Never   Vaping Use    Vaping status: Never Used   Substance and Sexual Activity    Alcohol  use: Yes     Comment: 2-3 times a week; drinks 6-10 beers at a time    Drug use: Never    Sexual activity: Yes     Partners: Female     Birth control/protection: Condom   Other Topics Concern    Not on file   Social History Narrative    Relationships: Single    Children: None    Pets: Dog - black lab    School: N/A    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    Origin: Born and raised in [place]    Interests: Cars - [email or web address] - [employer] - yellow;    Spiritual: Not Jainism, not spiritual     Social Drivers of Health     Food Insecurity: Not on file   Transportation Needs: Not on file   Stress: Not on file   Housing Stability: Not on file     Surgical History:  Past Surgical History[3]    Family History:  Family History[4]        Typical Dietary Intake:  Breakfast AM Snack Lunch PM Snack Dinner   Eggs, toast, water  Hot lunch, water ETOH FF   Sweet tooth  Eats quickly  ETOH: 6-8 beers    Soda Drinker?: No  If yes, how much?:      Number of restaurant or fast food meals/week:  5 meals/week    Nutritional Goals Reviewed and Discussed:     Limit carbohydrates to 100 gms per day, Eat 100-200 calories within 1 hour of waking up, and Eat 3-4 cups of fresh fruit or vegetables daily    Behavior Modifications Reviewed and Discussed:    Eat breakfast, Eat 3 meals per day, Plan meals in advance, Read nutrition labels, Drink 64oz of water per day, Maintain a daily food journal, No drinking 30 minutes before or after meals, Utilize portion control strategies to reduce calorie intake, Identify triggers for eating and manage cues, and Eat slowly and take 20 to 30 minutes to complete each meal      ROS:  Constitutional: positive for fatigue  Respiratory: negative  Cardiovascular: negative  Gastrointestinal: positive for reflux symptoms  Musculoskeletal:negative  Neurological: negative  Behavioral/Psych: positive for stress  Endocrine: negative    Physical Exam:  General appearance: alert, appears stated age,  cooperative, and morbidly obese  Head: Normocephalic, without obvious abnormality, atraumatic  Back: symmetric, no curvature. ROM normal. No CVA tenderness.  Lungs: clear to auscultation bilaterally  Heart: S1, S2 normal, no murmur, click, rub or gallop, regular rate and rhythm  Abdomen:  soft, obese, non tender  Extremities: extremities normal, atraumatic, no cyanosis or edema  Skin: Skin color, texture, turgor normal. No rashes or lesions  Neurologic: Grossly normal    ASSESSMENT     HYPERCHOLESTEROLEMIA:  The patient states that his cholesterol has been well controlled on his current diet    Lab Results   Component Value Date/Time    CHOLEST 196 10/04/2024 11:48 AM     (H) 10/04/2024 11:48 AM    HDL 44 10/04/2024 11:48 AM    TRIG 108 10/04/2024 11:48 AM    VLDL 20 10/04/2024 11:48 AM         OBSTRUCTIVE SLEEP APNEA: The patient states his sleep apnea has been stable since the last clinic visit. There has not been any increase in hyper-somnolence.       Encounter Diagnosis(ses):   1. Dyslipidemia    2. MAREK (obstructive sleep apnea)    3. Fatty liver    4. Obesity (BMI 30-39.9)        PLAN     Patient is not interested in bariatric surgery. Patient desires to pursue traditional weight loss at this time.      DYSLIPIDEMIA: Stable on the above prescribed meal plan  Liver function stable.    Lab Results   Component Value Date/Time    CHOLEST 196 10/04/2024 11:48 AM     (H) 10/04/2024 11:48 AM    HDL 44 10/04/2024 11:48 AM    TRIG 108 10/04/2024 11:48 AM    VLDL 20 10/04/2024 11:48 AM       Patient was instructed to continue wearing their CPaP as recommended.     Fatty liver: may improve with diet and exercise  Cut back on ETOH    Goals for next month:  1. Keep a food log.  2. Drink 48-64 ounces of non-caloric beverages per day. No fruit juices or regular soda.  3. Increase activity-upper body exercises, walk 10 minutes per day.  4. Increase fruit and vegetable servings to 5-6 per day.      PHENTERMINE:  Since the patient would like to try phentermine, and is aware of the potential side effects (hypertension, palpitations, tachycardia, and anxiety), I will give Neil Cobb a prescription today to be used in conjunction with the above diet and exercise program. The patient will check his heart rate and blood pressure on a regular basis. He will call me if his BP goes over 140/90 or if he has palpitations or racing heart rate. He understands that I will not call in the prescription for him; he has to have an appointment to have the medication refilled.   Will start at 15 mg  Needs EKG    Careful with ETOH    Diagnoses and all orders for this visit:    Dyslipidemia    MAREK (obstructive sleep apnea)    Fatty liver    Obesity (BMI 30-39.9)        Jorge Campos MD             [1]   Past Medical History:   Dyslipidemia    MAREK (obstructive sleep apnea)    Psoriasis    TB lung, latent   [2]   Current Outpatient Medications   Medication Sig Dispense Refill    Risankizumab-rzaa (SKYRIZI SC) Inject into the skin.     [3] No past surgical history on file.  [4]   Family History  Problem Relation Age of Onset    No Known Problems Mother     Hypertension Father     No Known Problems Brother     No Known Problems Maternal Grandmother     Other (esophageal cancer) Maternal Grandfather     No Known Problems Paternal Grandmother     Other (lung cancer) Paternal Grandfather     Colon Cancer Neg     Prostate Cancer Neg     Diabetes Neg     Heart Disease Neg     Hyperlipidemia Neg

## 2025-05-27 DIAGNOSIS — E66.9 OBESITY (BMI 30-39.9): ICD-10-CM

## 2025-05-27 RX ORDER — PHENTERMINE HYDROCHLORIDE 30 MG/1
30 CAPSULE ORAL EVERY MORNING
Qty: 30 CAPSULE | Refills: 5 | Status: SHIPPED | OUTPATIENT
Start: 2025-05-27

## 2025-06-17 ENCOUNTER — APPOINTMENT (OUTPATIENT)
Dept: CT IMAGING | Age: 40
End: 2025-06-17
Attending: EMERGENCY MEDICINE
Payer: COMMERCIAL

## 2025-06-17 ENCOUNTER — HOSPITAL ENCOUNTER (OUTPATIENT)
Age: 40
Discharge: HOME | End: 2025-06-17
Attending: EMERGENCY MEDICINE
Payer: COMMERCIAL

## 2025-06-17 VITALS
OXYGEN SATURATION: 98 % | TEMPERATURE: 98 F | DIASTOLIC BLOOD PRESSURE: 87 MMHG | RESPIRATION RATE: 16 BRPM | SYSTOLIC BLOOD PRESSURE: 134 MMHG | HEART RATE: 70 BPM

## 2025-06-17 DIAGNOSIS — R10.9 LEFT FLANK PAIN: Primary | ICD-10-CM

## 2025-06-17 LAB
#MXD IC: 0.9 X10ˆ3/UL (ref 0.1–1)
ATRIAL RATE: 75 BPM
BILIRUB UR QL STRIP: NEGATIVE
BUN BLD-MCNC: 9 MG/DL (ref 7–18)
CHLORIDE BLD-SCNC: 102 MMOL/L (ref 98–112)
CLARITY UR: CLEAR
CO2 BLD-SCNC: 26 MMOL/L (ref 21–32)
COLOR UR: YELLOW
CREAT BLD-MCNC: 1 MG/DL (ref 0.7–1.3)
EGFRCR SERPLBLD CKD-EPI 2021: 97 ML/MIN/1.73M2 (ref 60–?)
GLUCOSE BLD-MCNC: 91 MG/DL (ref 70–99)
GLUCOSE UR STRIP-MCNC: NEGATIVE MG/DL
HCT VFR BLD AUTO: 47.4 % (ref 39–53)
HCT VFR BLD CALC: 51 % (ref 37–53)
HGB BLD-MCNC: 15.4 G/DL (ref 13–17.5)
HGB UR QL STRIP: NEGATIVE
ISTAT IONIZED CALCIUM FOR CHEM 8: 1.24 MMOL/L (ref 1.12–1.32)
KETONES UR STRIP-MCNC: NEGATIVE MG/DL
LEUKOCYTE ESTERASE UR QL STRIP: NEGATIVE
LYMPHOCYTES # BLD AUTO: 2.5 X10ˆ3/UL (ref 1–4)
LYMPHOCYTES NFR BLD AUTO: 34.3 %
MCH RBC QN AUTO: 31.6 PG (ref 26–34)
MCHC RBC AUTO-ENTMCNC: 32.5 G/DL (ref 31–37)
MCV RBC AUTO: 97.3 FL (ref 80–100)
MIXED CELL %: 12.4 %
NEUTROPHILS # BLD AUTO: 3.9 X10ˆ3/UL (ref 1.5–7.7)
NEUTROPHILS NFR BLD AUTO: 53.3 %
NITRITE UR QL STRIP: NEGATIVE
P AXIS: 42 DEGREES
P-R INTERVAL: 152 MS
PH UR STRIP: 5.5 [PH]
PLATELET # BLD AUTO: 165 X10ˆ3/UL (ref 150–450)
POTASSIUM BLD-SCNC: 4.2 MMOL/L (ref 3.6–5.1)
PROT UR STRIP-MCNC: NEGATIVE MG/DL
Q-T INTERVAL: 386 MS
QRS DURATION: 90 MS
QTC CALCULATION (BEZET): 431 MS
R AXIS: 24 DEGREES
RBC # BLD AUTO: 4.87 X10ˆ6/UL (ref 4.3–5.7)
SODIUM BLD-SCNC: 139 MMOL/L (ref 136–145)
SP GR UR STRIP: >=1.03
T AXIS: 18 DEGREES
TROPONIN I BLD-MCNC: <0.02 NG/ML (ref ?–0.05)
UROBILINOGEN UR STRIP-ACNC: <2 MG/DL
VENTRICULAR RATE: 75 BPM
WBC # BLD AUTO: 7.3 X10ˆ3/UL (ref 4–11)

## 2025-06-17 PROCEDURE — 93005 ELECTROCARDIOGRAM TRACING: CPT

## 2025-06-17 PROCEDURE — 81002 URINALYSIS NONAUTO W/O SCOPE: CPT

## 2025-06-17 PROCEDURE — 93010 ELECTROCARDIOGRAM REPORT: CPT

## 2025-06-17 PROCEDURE — 84484 ASSAY OF TROPONIN QUANT: CPT

## 2025-06-17 PROCEDURE — 36415 COLL VENOUS BLD VENIPUNCTURE: CPT

## 2025-06-17 PROCEDURE — 99215 OFFICE O/P EST HI 40 MIN: CPT

## 2025-06-17 PROCEDURE — 80047 BASIC METABLC PNL IONIZED CA: CPT

## 2025-06-17 PROCEDURE — 85025 COMPLETE CBC W/AUTO DIFF WBC: CPT | Performed by: EMERGENCY MEDICINE

## 2025-06-17 PROCEDURE — 87086 URINE CULTURE/COLONY COUNT: CPT | Performed by: EMERGENCY MEDICINE

## 2025-06-17 PROCEDURE — 74176 CT ABD & PELVIS W/O CONTRAST: CPT | Performed by: EMERGENCY MEDICINE

## 2025-06-17 PROCEDURE — 99205 OFFICE O/P NEW HI 60 MIN: CPT

## 2025-06-17 RX ORDER — CYCLOBENZAPRINE HCL 5 MG
5 TABLET ORAL 3 TIMES DAILY PRN
Qty: 21 TABLET | Refills: 0 | Status: SHIPPED | OUTPATIENT
Start: 2025-06-17

## 2025-06-17 NOTE — ED PROVIDER NOTES
Patient Seen in: [facility]        History  Chief Complaint   Patient presents with    Back Pain     Stated Complaint: Left side pain    Subjective:   HPI    Patient is a 39-year-old male with past history of latent TB who presents now with left lower back/flank pain.  The patient states his pain initially began on Saturday.  The patient denies any injury prior to the onset of symptoms.  The patient states the pain is primarily in the left lower back but does radiate around to the left flank and even the left upper quadrant area.  Patient denies any nausea or vomiting.  The patient denies any shortness of breath.  The patient denies any fever.  The patient denies any urinary symptoms.  Patient states the pain is discretely \"below the rib cage\" in the left upper abdominal location.      Objective:     Past Medical History:    Dyslipidemia    Fatty liver    MAREK (obstructive sleep apnea)    Psoriasis    TB lung, latent              History reviewed. No pertinent surgical history.             Social History     Socioeconomic History    Marital status: Single   Tobacco Use    Smoking status: Former     Current packs/day: 0.00     Average packs/day: 1 pack/day for 15.0 years (15.0 ttl pk-yrs)     Types: Cigarettes     Start date: 2004     Quit date: 2019     Years since quittin.8     Passive exposure: Past    Smokeless tobacco: Never   Vaping Use    Vaping status: Never Used   Substance and Sexual Activity    Alcohol use: Yes     Comment: 2-3 times a week; drinks 6-10 beers at a time    Drug use: Never    Sexual activity: Yes     Partners: Female     Birth control/protection: Condom   Social History Narrative    Relationships: Single    Children: None    Pets: Dog - black lab    School: N/A    Work: 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  Constitutional and vital signs reviewed.      All other systems reviewed and negative except as noted above.                  Physical Exam    ED Triage Vitals [06/17/25 1237]   /87   Pulse 70   Resp 16   Temp 98.3 °F (36.8 °C)   Temp src Oral   SpO2 98 %   O2 Device None (Room air)       Current Vitals:   Vital Signs  BP: 134/87  Pulse: 70  Resp: 16  Temp: 98.3 °F (36.8 °C)  Temp src: Oral    Oxygen Therapy  SpO2: 98 %  O2 Device: None (Room air)            Physical Exam    Constitutional: Well-developed well-nourished in no acute distress  Head: Normocephalic, no swelling or tenderness  Eyes: Nonicteric sclera, no conjunctival injection  ENT: TMs are clear and flat bilaterally.  There is no posterior pharyngeal erythema  Chest: Clear to auscultation, no tenderness  Cardiovascular: Regular rate and rhythm without murmur  Abdomen: Soft and nondistended; there is mild left upper quadrant tenderness to palpation.  There is no left periumbilical or left lower quadrant tenderness to palpation  Neurologic: Patient is awake, alert and oriented ×3.  The patient's motor strength is 5 out of 5 and symmetric in the upper and lower extremities bilaterally  Extremities: No focal swelling or tenderness  Skin: No pallor, no redness or warmth to the touch          ED Course  Labs Reviewed   POCT ISTAT CHEM8 CARTRIDGE - Normal   ISTAT TROPONIN - Normal   POCT CBC   EM[facility] POCT URINALYSIS DIPSTICK   URINE CULTURE, ROUTINE     EKG    Rate, intervals and axes as noted on EKG Report.  Rate: 75  Rhythm: Sinus Rhythm  Reading: Patient's EKG demonstrates a normal sinus rhythm with a rate of 75.  The patient's axis and intervals are normal.  There is no acute ST elevation             Pulse ox is 98% on room air, normal.  Vital signs are stable  PERC score is 0     Patient's CT images and the radiologist report demonstrating no  acute CT abnormality to account for the patient's symptoms, fatty liver were reviewed by myself.    Patient's lab results including normal CBC/electrolytes/urinalysis, EKG/CT reports were reviewed with the patient.  Most likely etiology of the patient's pain is musculoskeletal.  Patient has been taking anti-inflammatories.  Will add muscle relaxer.  Recommend follow-up for any worsening/persistent symptoms                  MDM     flank pain including musculoskeletal causes, kidney stone, pyelonephritis, intra-abdominal causes such as diverticulitis and appendicitis       Medical Decision Making      Disposition and Plan     Clinical Impression:  1. Left flank pain         Disposition:  Discharge  6/17/2025  1:52 pm    Follow-up:  Sly Cabello MD  [address] [place] [address] [id number] [phone]    In 3 days  If symptoms worsen          Medications Prescribed:  Current Discharge Medication List        START taking these medications    Details   cyclobenzaprine 5 MG Oral Tab Take 1 tablet (5 mg total) by mouth 3 (three) times daily as needed for Muscle spasms.  Qty: 21 tablet, Refills: 0                   Supplementary Documentation:

## 2025-06-17 NOTE — ED INITIAL ASSESSMENT (HPI)
Patient arrives ambulatory with c/o left side from back to front x 3-4 days. Denies injury/fall. Denies n/v/d. Denies rash. Denies fevers. Reports he thought it was a pulled muscle originally, but the pain is not improving.

## 2025-06-17 NOTE — DISCHARGE INSTRUCTIONS
Flexeril as prescribed.  Anti-inflammatory such as ibuprofen for pain.  Follow-up with your primary MD for any worsening symptoms

## 2025-06-30 ENCOUNTER — APPOINTMENT (OUTPATIENT)
Dept: OTHER | Facility: HOSPITAL | Age: 40
End: 2025-06-30
Attending: EMERGENCY MEDICINE

## (undated) NOTE — Clinical Note
Patient enrolled in COVID+ monitoring program.  Scheduled virtual visit for Monday, for possible clearance to return to work.

## (undated) NOTE — LETTER
12/30/20        [address] [facility]      Dear Tim Waldrop,    [address] records indicate that you have outstanding lab work and or testing that was ordered for you and has not yet been completed:  CBC w/Diff-call [phone] to sched

## (undated) NOTE — LETTER
8/31/2020          To Whom It May Concern:    Alondra Ponce is currently under my medical care and has been symptom free for 72 hours. He may return to work at this time. He is to continue precautions of masking, distance and wearing gloves.    If you